# Patient Record
Sex: MALE | Race: WHITE | NOT HISPANIC OR LATINO | Employment: UNEMPLOYED | URBAN - METROPOLITAN AREA
[De-identification: names, ages, dates, MRNs, and addresses within clinical notes are randomized per-mention and may not be internally consistent; named-entity substitution may affect disease eponyms.]

---

## 2021-04-05 ENCOUNTER — HOSPITAL ENCOUNTER (OUTPATIENT)
Dept: OTHER | Facility: HOSPITAL | Age: 71
Discharge: HOME | End: 2021-04-05
Attending: INTERNAL MEDICINE

## 2021-04-05 LAB
CREAT BLD-MCNC: 1.9 MG/DL (ref 0.6–1.4)
GFR SERPLBLD BASED ON 1.73 SQ M-ARVRAT: 35 ML/MIN/{1.73_M2}

## 2021-11-10 ENCOUNTER — TRANSCRIBE ORDERS (OUTPATIENT)
Dept: ADMINISTRATIVE | Facility: HOSPITAL | Age: 71
End: 2021-11-10

## 2021-11-10 DIAGNOSIS — R53.83 OTHER FATIGUE: ICD-10-CM

## 2021-11-10 DIAGNOSIS — I10 BENIGN ESSENTIAL HTN: Primary | ICD-10-CM

## 2022-05-04 ENCOUNTER — TELEPHONE (OUTPATIENT)
Dept: UROLOGY | Facility: CLINIC | Age: 72
End: 2022-05-04

## 2022-11-13 ENCOUNTER — HOSPITAL ENCOUNTER (EMERGENCY)
Facility: HOSPITAL | Age: 72
End: 2022-11-13

## 2023-04-17 NOTE — PROGRESS NOTES
Chief Complaint  Diabetes (New patient no cgm or diabetic )    Referred By: Terry Judd MD    Subjective          Lamin Martinez presents to [facility] for diabetes medication management    History of Present Illness    Visit type:  to establish care  Diabetes type:  Type 2  Age at time of dx/Year of dx/Number of years: He was diagnosed in February 22  Family History of Diabetes: Brother and sister  Current diabetes status/concerns/issues: He self-referred himself to our office for management of his diabetes.Reports he is having some blurred vision.  States his vision is gotten a lot worse and its especially worse in the evening.  States he is also more sensitive to the sun. States he would like to get his blood sugar under better control and would like to learn foods to eat and not to eat to better control his diabetes.  Other current health concerns: None  Current Diabetes symptoms:    Polyuria: No   Polydipsia: No   Polyphagia: No   Blurred vision: Yes   Excessive fatigue: Yes  Known Diabetes complications:  Neuropathy: None; Location: N/A  Renal: Stage IIIb moderate (GFR = 30-44 mL/min. He is followed by Selma ZUNIGA at [facility]  Eyes: None; Location: N/A  Amputation/Wounds: None  GI: None  Cardiovascular: Hypertension, Hyperlipidemia, CAD and Other: History of 5 cardiac stents  ED: Patient Reported  Other: None  Hospitalizations/ED/911 secondary to DM?  No  Hypoglycemia:  None reported at this time  Hypoglycemia Symptoms:  No hypoglycemia at this time  Current Diabetes treatment: Actos 15 mg once daily and glipizide 10 mg twice daily.  Prior diabetes treatments:  Januvia  Using ACEI or ARB: Yes, Lisinopril 10 mg daily, Managed by other provider  Using Statin: Yes, Lipitor 80 mg once daily, Managed by other provider  Blood glucose device:  Does Not Test  Blood glucose monitoring frequency:  None  Blood glucose range/average:  n/a   Glucose Source:  N/A  Dietary behavior:  Limits high carb/sweet foods, Avoids sugary drinks, Number of meals each day - 2; Number of snacks each day - 1, History of Diabetes Nutrition Counseling - NO  Activity/Exercise:  He walks several days a week but reports she does have quite a bit of muscle aches and he gets short of breath  Last Eye Exam: None; Location: N/A  Last Foot Exam: None  Diabetes Education Hx: Comprehensive Diabetes Education  Social Determinants of Health: None    Past Medical History:   Diagnosis Date   • CAD (coronary artery disease)    • Diabetes    • History of cardiac cath      Past Surgical History:   Procedure Laterality Date   • CORONARY ANGIOPLASTY WITH STENT PLACEMENT      5 stents placed 2/22   • TONSILLECTOMY       Family History   Problem Relation Age of Onset   • Diabetes Sister    • Diabetes Brother      Social History     Socioeconomic History   • Marital status: Single   Tobacco Use   • Smoking status: Former     Types: Cigarettes   Substance and Sexual Activity   • Alcohol use: Not Currently   • Drug use: Never     No Known Allergies    Current Outpatient Medications:   •  Ascorbic Acid 500 MG chewable tablet, Chew 500 mg Daily., Disp: , Rfl:   •  aspirin 81 MG chewable tablet, Daily., Disp: , Rfl:   •  atorvastatin (LIPITOR) 80 MG tablet, Take 1 tablet by mouth Daily., Disp: , Rfl:   •  clopidogrel (PLAVIX) 75 MG tablet, Take 1 tablet by mouth Daily., Disp: , Rfl:   •  glipizide (GLUCOTROL XL) 10 MG 24 hr tablet, TAKE 1 TABLET BY MOUTH TWICE DAILY FOR BLOOD SUGAR, Disp: , Rfl:   •  isosorbide mononitrate (IMDUR) 30 MG 24 hr tablet, TAKE 1 TABLET BY MOUTH ONCE DAILY. DO NOT CRUSH OR CHEW, Disp: , Rfl:   •  lisinopril (PRINIVIL,ZESTRIL) 10 MG tablet, Take 1 tablet by mouth Daily., Disp: , Rfl:   •  multivitamin (THERAGRAN) tablet tablet, Take 1 tablet by mouth Daily., Disp: , Rfl:   •  pioglitazone (ACTOS) 15 MG tablet, Take 1 tablet by mouth Daily., Disp: , Rfl:   •  tamsulosin (FLOMAX) 0.4 MG  "capsule 24 hr capsule, , Disp: , Rfl:   •  vitamin D3 125 MCG (5000 UT) capsule capsule, TAKE 2 CAPSULES BY MOUTH ON MONDAY, WEDNESDAY, AND FRIDAY, Disp: , Rfl:   •  Blood Glucose Monitoring Suppl device, Use as directed for blood glucose monitoring, Disp: 1 each, Rfl: 0  •  dapagliflozin Propanediol (Farxiga) 10 MG tablet, Take 10 mg by mouth Daily for 90 days., Disp: 90 tablet, Rfl: 1  •  glucose blood test strip, Test blood glucose 1 time each day, Disp: 100 each, Rfl: 5  •  Lancets misc, Test blood glucose 1 time each day, Disp: 100 each, Rfl: 5    Objective     Vitals:    04/20/23 1306   BP: 127/58   BP Location: Left arm   Patient Position: Sitting   Cuff Size: Adult   Pulse: 69   Temp: 97.2 °F (36.2 °C)   TempSrc: Temporal   SpO2: 97%   Weight: 98.6 kg (217 lb 6.4 oz)   Height: 170.2 cm (67\")     Body mass index is 34.05 kg/m².    Physical Exam  Constitutional:       Appearance: Normal appearance. He is obese.      Comments: Obesity (BMI 30 - 39.9) Pt Current BMI = 34.05     HENT:      Head: Normocephalic and atraumatic.      Right Ear: External ear normal.      Left Ear: External ear normal.      Nose: Nose normal.   Eyes:      Extraocular Movements: Extraocular movements intact.      Conjunctiva/sclera: Conjunctivae normal.   Pulmonary:      Effort: Pulmonary effort is normal.   Musculoskeletal:         General: Normal range of motion.      Cervical back: Normal range of motion.   Skin:     General: Skin is warm and dry.   Neurological:      General: No focal deficit present.      Mental Status: He is alert and oriented to person, place, and time. Mental status is at baseline.   Psychiatric:         Mood and Affect: Mood normal.         Behavior: Behavior normal.         Thought Content: Thought content normal.         Judgment: Judgment normal.             Result Review :   The following data was reviewed by: EFRAIN Quesada on 04/20/2023:    Most Recent A1C        4/20/2023    13:21   HGBA1C " Most Recent   Hemoglobin A1C 9.3         A1C Last 3 Results        4/20/2023    13:21   HGBA1C Last 3 Results   Hemoglobin A1C 9.3       A1c in the office today is 9.3% indicating uncontrolled type 2 DM.    Glucose   Date Value Ref Range Status   04/20/2023 311 (H) 70 - 99 mg/dL Final     Comment:     Serial Number: [id number]:  [id number]     Point of care glucose is elevated at 311.               Assessment: Pt self referred himself to our office for management of his diabetes. He would like to meet with a dietician to teach him which foods are best for his diabetes and which foods to avoid. Ordered consult with diabetic educator as well.  He has reduced his carb and sugar intake. He is currently taking actos and glipizide. His a1c is 9.3% indicating uncontrolled type 2 DM. His history is significant for : Hypertension, Hyperlipidemia, CAD,  History of 5 cardiac stents and Stage IIIb moderate (GFR = 30-44 mL/min. He is followed by Selma ZUNIGA at [facility]. He admits he has not been checking his glucose.       Diagnoses and all orders for this visit:    1. Uncontrolled type 2 diabetes mellitus with hyperglycemia (Primary)  -     POC Glycosylated Hemoglobin (Hb A1C)  -     Lipid Panel; Future  -     Comprehensive Metabolic Panel; Future  -     Microalbumin / Creatinine Urine Ratio - Urine, Clean Catch; Future  -     Ambulatory Referral to [facility] - [facility]  -     Ambulatory Referral to Podiatry  -     dapagliflozin Propanediol (Farxiga) 10 MG tablet; Take 10 mg by mouth Daily for 90 days.  Dispense: 90 tablet; Refill: 1  -     Blood Glucose Monitoring Suppl device; Use as directed for blood glucose monitoring  Dispense: 1 each; Refill: 0  -     glucose blood test strip; Test blood glucose 1 time each day  Dispense: 100 each; Refill: 5  -     Lancets misc; Test blood glucose 1 time each day  Dispense: 100 each; Refill: 5    2. Screening for thyroid disorder  -     TSH;  Future    Other orders  -     POC Glucose        Plan: Placed a consult for the dietician for carb counting and low carb/low sugar diet. Prescribed Farxiga to improve glycemic control, reduce the risk of sustained eGFR decline, end stage kidney disease and cardiovascular death. Ordered labs-instructed pt not to start Farxiga until I review his labs. Sent in a new glucometer and supplies. Instructed to check his blood sugar every morning fasting. Instructed to bring a log to his next visit for review.    The patient will monitor his blood glucose levels once daily.  If he develops problematic hyperglycemia or hypoglycemia or adverse drug reactions, he will contact the office for further instructions.        Follow Up     Return in about 4 weeks (around 5/18/2023) for Medication Mgmt, DSME, DMNT.    Patient was given instructions and counseling regarding his condition or for health maintenance advice. Please see specific information pulled into the AVS if appropriate.     Michelle Walters, APRN  04/20/2023      Dictated Utilizing Dragon Dictation.  Please note that portions of this note were completed with a voice recognition program.  Part of this note may be an electronic transcription/translation of spoken language to printed text using the Dragon Dictation System.

## 2023-04-20 ENCOUNTER — OFFICE VISIT (OUTPATIENT)
Dept: DIABETES SERVICES | Facility: HOSPITAL | Age: 73
End: 2023-04-20
Payer: MEDICARE

## 2023-04-20 VITALS
HEART RATE: 69 BPM | BODY MASS INDEX: 34.12 KG/M2 | OXYGEN SATURATION: 97 % | DIASTOLIC BLOOD PRESSURE: 58 MMHG | WEIGHT: 217.4 LBS | HEIGHT: 67 IN | TEMPERATURE: 97.2 F | SYSTOLIC BLOOD PRESSURE: 127 MMHG

## 2023-04-20 DIAGNOSIS — Z13.29 SCREENING FOR THYROID DISORDER: ICD-10-CM

## 2023-04-20 DIAGNOSIS — E11.65 UNCONTROLLED TYPE 2 DIABETES MELLITUS WITH HYPERGLYCEMIA: Primary | ICD-10-CM

## 2023-04-20 PROBLEM — E11.9 DIABETES MELLITUS: Status: ACTIVE | Noted: 2023-04-20

## 2023-04-20 PROBLEM — E78.5 HYPERLIPIDEMIA: Status: ACTIVE | Noted: 2023-04-20

## 2023-04-20 PROBLEM — I10 HYPERTENSION: Status: ACTIVE | Noted: 2023-04-20

## 2023-04-20 LAB
EXPIRATION DATE: ABNORMAL
GLUCOSE BLDC GLUCOMTR-MCNC: 311 MG/DL (ref 70–99)
HBA1C MFR BLD: 9.3 %
Lab: ABNORMAL

## 2023-04-20 PROCEDURE — 82962 GLUCOSE BLOOD TEST: CPT | Performed by: NURSE PRACTITIONER

## 2023-04-20 PROCEDURE — G0463 HOSPITAL OUTPT CLINIC VISIT: HCPCS | Performed by: NURSE PRACTITIONER

## 2023-04-20 RX ORDER — DAPAGLIFLOZIN 10 MG/1
10 TABLET, FILM COATED ORAL DAILY
Qty: 90 TABLET | Refills: 1 | Status: SHIPPED | OUTPATIENT
Start: 2023-04-20 | End: 2023-07-19

## 2023-04-20 RX ORDER — LISINOPRIL 10 MG/1
1 TABLET ORAL DAILY
COMMUNITY
Start: 2023-02-20

## 2023-04-20 RX ORDER — CLOPIDOGREL BISULFATE 75 MG/1
1 TABLET ORAL DAILY
COMMUNITY
Start: 2023-03-13

## 2023-04-20 RX ORDER — PIOGLITAZONEHYDROCHLORIDE 15 MG/1
1 TABLET ORAL DAILY
COMMUNITY
Start: 2023-04-09

## 2023-04-20 RX ORDER — DIPHENOXYLATE HYDROCHLORIDE AND ATROPINE SULFATE 2.5; .025 MG/1; MG/1
1 TABLET ORAL DAILY
COMMUNITY

## 2023-04-20 RX ORDER — ATORVASTATIN CALCIUM 80 MG/1
1 TABLET, FILM COATED ORAL DAILY
COMMUNITY
Start: 2023-01-29

## 2023-04-20 RX ORDER — ASPIRIN 81 MG/1
TABLET, CHEWABLE ORAL DAILY
COMMUNITY

## 2023-04-20 RX ORDER — TAMSULOSIN HYDROCHLORIDE 0.4 MG/1
CAPSULE ORAL
COMMUNITY
Start: 2023-03-22

## 2023-04-20 RX ORDER — LANCETS 30 GAUGE
EACH MISCELLANEOUS
Qty: 100 EACH | Refills: 5 | Status: SHIPPED | OUTPATIENT
Start: 2023-04-20

## 2023-04-20 RX ORDER — ISOSORBIDE MONONITRATE 30 MG/1
TABLET, EXTENDED RELEASE ORAL
COMMUNITY
Start: 2023-04-09

## 2023-04-20 RX ORDER — GLIPIZIDE 10 MG/1
TABLET, FILM COATED, EXTENDED RELEASE ORAL
COMMUNITY
Start: 2023-02-14

## 2023-04-26 DIAGNOSIS — E11.65 UNCONTROLLED TYPE 2 DIABETES MELLITUS WITH HYPERGLYCEMIA: Primary | ICD-10-CM

## 2023-05-01 ENCOUNTER — TELEPHONE (OUTPATIENT)
Dept: DIABETES SERVICES | Facility: HOSPITAL | Age: 73
End: 2023-05-01
Payer: MEDICARE

## 2023-05-01 NOTE — TELEPHONE ENCOUNTER
Pt called and wanted you to know that when he went to go  the Jardiance from the pharmacy it was going to cost him 757$. He did not pick this medication up as it is to costly for him. Pt would like to know what you would like to do in place of this. Pt would like a return call at [phone]    Please advise

## 2023-05-02 DIAGNOSIS — E11.65 UNCONTROLLED TYPE 2 DIABETES MELLITUS WITH HYPERGLYCEMIA: Primary | ICD-10-CM

## 2023-05-02 NOTE — TELEPHONE ENCOUNTER
Patient has previously tried to get this medication at pharmacy prescription was also expensive please advise.

## 2023-05-02 NOTE — TELEPHONE ENCOUNTER
Lets have him increase his Actos to 30mg once day-he can double his 15mg to equal 30mg-so 2 tabs once day. He will need to call me when he needs a refill

## 2023-05-12 ENCOUNTER — EDUCATION (OUTPATIENT)
Dept: DIABETES SERVICES | Facility: HOSPITAL | Age: 73
End: 2023-05-12
Payer: MEDICARE

## 2023-05-12 DIAGNOSIS — E11.8 TYPE 2 DIABETES MELLITUS WITH UNSPECIFIED COMPLICATIONS: Primary | ICD-10-CM

## 2023-05-12 PROCEDURE — G0108 DIAB MANAGE TRN  PER INDIV: HCPCS

## 2023-05-12 NOTE — LETTER
May 22, 2023       No Recipients    Patient: Lamin Martinez   YOB: 1950   Date of Visit: 5/12/2023       Dear Dr. Valladares Recipients:    Thank you for referring Lamin Martinez to me for evaluation. Below are the relevant portions of my assessment and plan of care.    If you have questions, please do not hesitate to call me. I look forward to following Lamin along with you.         Sincerely,        Liudmila Mantilla RN        CC:   No Recipients     Initial Visit and Education Plan:  Lamin Martinez 72 y.o. presented to [facility] at [facility] for initial self diabetes management education and training.  Patient states the reason for their visit is to lower his blood glucose.  Lamin Martinez is referred by Michelle Walters,*.     Ht: 67 inches    Wt: 217 pounds    No Known Allergies     LABS:  Lab Results (most recent)       Per EFRAIN Randall note for 2023 hemoglobin A1c was 9.3           Labs reviewed with patient.  I explained in detail what a hemoglobin A1c is.  I also explained his blood glucose was considered uncontrolled.    Past Medical History:   Diagnosis Date   • CAD (coronary artery disease)    • Diabetes    • History of cardiac cath         Past Surgical History:   • CORONARY ANGIOPLASTY WITH STENT PLACEMENT    5 stents placed 2/22   • TONSILLECTOMY        Social History     Tobacco Use   • Smoking status: Former     Types: Cigarettes   Substance Use Topics   • Alcohol use: Not Currently   • Drug use: Never        Family History   Problem Relation Age of Onset   • Diabetes Sister    • Diabetes Brother         Education Plan as follows:      Blood Glucose Monitoring Instructions and Plan:   We reviewed blood glucose testing and ADA recommended blood glucose level for fasting, pre and post meals. Patient demonstrates understanding and importance of testing blood glucose *** times a day; Patient will log BG results. Patient was given written  material including blood glucose log.  I gave Mr. Martinez a sample glucose meter today.    Initial Nutrition Instructions and Plan:   Patient was instructed and demonstrated reading a food label. Serving size and carbohydrate amounts were emphasized.   60 carbs per meal 3 meals a day  15-20 carbs per snacks 3 snacks per day.   Patient was given written materials..   [employer]itness Pal Herson explained and demonstrated to patient.     Medication Instructions and Plan:     Current Outpatient Medications:   •  Ascorbic Acid 500 MG chewable tablet, Chew 500 mg Daily., Disp: , Rfl:   •  aspirin 81 MG chewable tablet, Daily., Disp: , Rfl:   •  atorvastatin (LIPITOR) 80 MG tablet, Take 1 tablet by mouth Daily., Disp: , Rfl:   •  Blood Glucose Monitoring Suppl device, Use as directed for blood glucose monitoring, Disp: 1 each, Rfl: 0  •  clopidogrel (PLAVIX) 75 MG tablet, Take 1 tablet by mouth Daily., Disp: , Rfl:   •  empagliflozin (Jardiance) 10 MG tablet tablet, Take 1 tablet by mouth Daily for 90 days., Disp: 90 tablet, Rfl: 1  •  glipizide (GLUCOTROL XL) 10 MG 24 hr tablet, TAKE 1 TABLET BY MOUTH TWICE DAILY FOR BLOOD SUGAR, Disp: , Rfl:   •  glucose blood test strip, Test blood glucose 1 time each day, Disp: 100 each, Rfl: 5  •  isosorbide mononitrate (IMDUR) 30 MG 24 hr tablet, TAKE 1 TABLET BY MOUTH ONCE DAILY. DO NOT CRUSH OR CHEW, Disp: , Rfl:   •  Lancets misc, Test blood glucose 1 time each day, Disp: 100 each, Rfl: 5  •  lisinopril (PRINIVIL,ZESTRIL) 10 MG tablet, Take 1 tablet by mouth Daily., Disp: , Rfl:   •  multivitamin (THERAGRAN) tablet tablet, Take 1 tablet by mouth Daily., Disp: , Rfl:   •  pioglitazone (ACTOS) 15 MG tablet, Take 1 tablet by mouth Daily., Disp: , Rfl:   •  tamsulosin (FLOMAX) 0.4 MG capsule 24 hr capsule, , Disp: , Rfl:   •  vitamin D3 125 MCG (5000 UT) capsule capsule, TAKE 2 CAPSULES BY MOUTH ON MONDAY, WEDNESDAY, AND FRIDAY, Disp: , Rfl:    Medication list was reviewed with patient.  Patient denies questions or concerns regarding current medication therapy. Patient was instructed to continue medications as prescribed.  Explained the benefits of insulin.  We also discussed continuous glucose monitor.    Exercise:   Patient has been instructed to walk for 10 minutes after each meal initially and build strength and endurance to achieve 30 minutes of sustained exercise per day; recommended patient seek advice from Primary Care Provider prior to beginning any exercise program if other health concerns exist.     Problem solving and reducing risks:   I explained the importance of keeping provider appointments, taking medications as prescribed, having laboratory work performed as ordered by provider and seeking care as soon as possible when complications occur.     Patient Selected Behavioral Goal :  #1 -to test blood glucose 1 time a day    Patient Selected Ongoing Support Plan:  Friend Support          Follow up Instructions and Plan:Patient was encouraged to contact DSME staff with questions and or concerns.  DSME staff contact information was given to patient.  Patient will be contacted when group classes resume.  DSME staff will contact patient at 3 months and 6 months to evaluate patient's further needs regarding diabetes.        This note will be forwarded to PCP.  CYNTHIA CALDERON-AWZANE, MLDE, [facility] [place]    Start Time: 2: 25  End Time: 3: 45      05/12/2023

## 2023-05-12 NOTE — LETTER
May 22, 2023       No Recipients    Patient: Lamin Martinez   YOB: 1950   Date of Visit: 5/12/2023       Dear Dr. Valladares Recipients:    Thank you for referring Lamin Martinez to me for evaluation. Below are the relevant portions of my assessment and plan of care.    If you have questions, please do not hesitate to call me. I look forward to following Lamin along with you.         Sincerely,        Liudmila Mantilla RN        CC:   No Recipients     Initial Visit and Education Plan:  Lamin Martinez 72 y.o. presented to [facility] at [facility] for initial self diabetes management education and training.  Patient states the reason for their visit is to lower his blood glucose.  Lamin Martinez is referred by Michelle Walters,*.     Ht: 67 inches    Wt: 217 pounds    No Known Allergies     LABS:  Lab Results (most recent)       Per EFRAIN Randall note for 2023 hemoglobin A1c was 9.3           Labs reviewed with patient.  I explained in detail what a hemoglobin A1c is.  I also explained his blood glucose was considered uncontrolled.    Past Medical History:   Diagnosis Date   • CAD (coronary artery disease)    • Diabetes    • History of cardiac cath         Past Surgical History:   • CORONARY ANGIOPLASTY WITH STENT PLACEMENT    5 stents placed 2/22   • TONSILLECTOMY        Social History     Tobacco Use   • Smoking status: Former     Types: Cigarettes   Substance Use Topics   • Alcohol use: Not Currently   • Drug use: Never        Family History   Problem Relation Age of Onset   • Diabetes Sister    • Diabetes Brother         Education Plan as follows:      Blood Glucose Monitoring Instructions and Plan:   We reviewed blood glucose testing and ADA recommended blood glucose level for fasting, pre and post meals. Patient demonstrates understanding and importance of testing blood glucose 1-2 times a day; Patient will log BG results. Patient was given written  material including blood glucose log.  I gave Mr. Martinez a sample glucose meter today.    Initial Nutrition Instructions and Plan:   Patient was instructed and demonstrated reading a food label. Serving size and carbohydrate amounts were emphasized.   60 carbs per meal 3 meals a day  15-20 carbs per snacks 3 snacks per day.   Patient was given written materials..   [employer] Pal Herson explained and demonstrated to patient.     Medication Instructions and Plan:     Current Outpatient Medications:   •  Ascorbic Acid 500 MG chewable tablet, Chew 500 mg Daily., Disp: , Rfl:   •  aspirin 81 MG chewable tablet, Daily., Disp: , Rfl:   •  atorvastatin (LIPITOR) 80 MG tablet, Take 1 tablet by mouth Daily., Disp: , Rfl:   •  Blood Glucose Monitoring Suppl device, Use as directed for blood glucose monitoring, Disp: 1 each, Rfl: 0  •  clopidogrel (PLAVIX) 75 MG tablet, Take 1 tablet by mouth Daily., Disp: , Rfl:   •  empagliflozin (Jardiance) 10 MG tablet tablet, Take 1 tablet by mouth Daily for 90 days., Disp: 90 tablet, Rfl: 1  •  glipizide (GLUCOTROL XL) 10 MG 24 hr tablet, TAKE 1 TABLET BY MOUTH TWICE DAILY FOR BLOOD SUGAR, Disp: , Rfl:   •  glucose blood test strip, Test blood glucose 1 time each day, Disp: 100 each, Rfl: 5  •  isosorbide mononitrate (IMDUR) 30 MG 24 hr tablet, TAKE 1 TABLET BY MOUTH ONCE DAILY. DO NOT CRUSH OR CHEW, Disp: , Rfl:   •  Lancets misc, Test blood glucose 1 time each day, Disp: 100 each, Rfl: 5  •  lisinopril (PRINIVIL,ZESTRIL) 10 MG tablet, Take 1 tablet by mouth Daily., Disp: , Rfl:   •  multivitamin (THERAGRAN) tablet tablet, Take 1 tablet by mouth Daily., Disp: , Rfl:   •  pioglitazone (ACTOS) 15 MG tablet, Take 1 tablet by mouth Daily., Disp: , Rfl:   •  tamsulosin (FLOMAX) 0.4 MG capsule 24 hr capsule, , Disp: , Rfl:   •  vitamin D3 125 MCG (5000 UT) capsule capsule, TAKE 2 CAPSULES BY MOUTH ON MONDAY, WEDNESDAY, AND FRIDAY, Disp: , Rfl:    Medication list was reviewed with patient.  Patient denies questions or concerns regarding current medication therapy. Patient was instructed to continue medications as prescribed.  Explained the benefits of insulin.  We also discussed continuous glucose monitor.    Exercise:   Patient has been instructed to walk for 10 minutes after each meal initially and build strength and endurance to achieve 30 minutes of sustained exercise per day; recommended patient seek advice from Primary Care Provider prior to beginning any exercise program if other health concerns exist.     Problem solving and reducing risks:   I explained the importance of keeping provider appointments, taking medications as prescribed, having laboratory work performed as ordered by provider and seeking care as soon as possible when complications occur.     Patient Selected Behavioral Goal :  #1 -to test blood glucose 1 time a day    Patient Selected Ongoing Support Plan:  Friend Support          Follow up Instructions and Plan:Patient was encouraged to contact DSME staff with questions and or concerns.  DSME staff contact information was given to patient.  Patient will be contacted when group classes resume.  DSME staff will contact patient at 3 months and 6 months to evaluate patient's further needs regarding diabetes.        This note will be forwarded to PCP.  CYNTHIA CALDERON-AWZANE, MLDE, [facility]    Start Time: 2: 25  End Time: 3: 45      05/12/2023

## 2023-05-12 NOTE — PROGRESS NOTES
Initial Visit and Education Plan:  Lamin Martinez 72 y.o. presented to [facility] at [facility] for initial self diabetes management education and training.  Patient states the reason for their visit is to lower his blood glucose.  Lamin Martinez is referred by Michelle Walters,*.     Ht: 67 inches    Wt: 217 pounds    No Known Allergies     LABS:  Lab Results (most recent)     Per EFRAIN Randall note for 2023 hemoglobin A1c was 9.3         Labs reviewed with patient.  I explained in detail what a hemoglobin A1c is.  I also explained his blood glucose was considered uncontrolled.    Past Medical History:   Diagnosis Date   • CAD (coronary artery disease)    • Diabetes    • History of cardiac cath         Past Surgical History:   • CORONARY ANGIOPLASTY WITH STENT PLACEMENT    5 stents placed 2/22   • TONSILLECTOMY        Social History     Tobacco Use   • Smoking status: Former     Types: Cigarettes   Substance Use Topics   • Alcohol use: Not Currently   • Drug use: Never        Family History   Problem Relation Age of Onset   • Diabetes Sister    • Diabetes Brother         Education Plan as follows:      Blood Glucose Monitoring Instructions and Plan:   We reviewed blood glucose testing and ADA recommended blood glucose level for fasting, pre and post meals. Patient demonstrates understanding and importance of testing blood glucose 1-2 times a day; Patient will log BG results. Patient was given written material including blood glucose log.  I gave Mr. Martinez a sample glucose meter today.    Initial Nutrition Instructions and Plan:   Patient was instructed and demonstrated reading a food label. Serving size and carbohydrate amounts were emphasized.   60 carbs per meal 3 meals a day  15-20 carbs per snacks 3 snacks per day.   Patient was given written materials..   My Fitness Pal Herson explained and demonstrated to patient.     Medication Instructions and Plan:     Current  Outpatient Medications:   •  Ascorbic Acid 500 MG chewable tablet, Chew 500 mg Daily., Disp: , Rfl:   •  aspirin 81 MG chewable tablet, Daily., Disp: , Rfl:   •  atorvastatin (LIPITOR) 80 MG tablet, Take 1 tablet by mouth Daily., Disp: , Rfl:   •  Blood Glucose Monitoring Suppl device, Use as directed for blood glucose monitoring, Disp: 1 each, Rfl: 0  •  clopidogrel (PLAVIX) 75 MG tablet, Take 1 tablet by mouth Daily., Disp: , Rfl:   •  empagliflozin (Jardiance) 10 MG tablet tablet, Take 1 tablet by mouth Daily for 90 days., Disp: 90 tablet, Rfl: 1  •  glipizide (GLUCOTROL XL) 10 MG 24 hr tablet, TAKE 1 TABLET BY MOUTH TWICE DAILY FOR BLOOD SUGAR, Disp: , Rfl:   •  glucose blood test strip, Test blood glucose 1 time each day, Disp: 100 each, Rfl: 5  •  isosorbide mononitrate (IMDUR) 30 MG 24 hr tablet, TAKE 1 TABLET BY MOUTH ONCE DAILY. DO NOT CRUSH OR CHEW, Disp: , Rfl:   •  Lancets misc, Test blood glucose 1 time each day, Disp: 100 each, Rfl: 5  •  lisinopril (PRINIVIL,ZESTRIL) 10 MG tablet, Take 1 tablet by mouth Daily., Disp: , Rfl:   •  multivitamin (THERAGRAN) tablet tablet, Take 1 tablet by mouth Daily., Disp: , Rfl:   •  pioglitazone (ACTOS) 15 MG tablet, Take 1 tablet by mouth Daily., Disp: , Rfl:   •  tamsulosin (FLOMAX) 0.4 MG capsule 24 hr capsule, , Disp: , Rfl:   •  vitamin D3 125 MCG (5000 UT) capsule capsule, TAKE 2 CAPSULES BY MOUTH ON MONDAY, WEDNESDAY, AND FRIDAY, Disp: , Rfl:    Medication list was reviewed with patient. Patient denies questions or concerns regarding current medication therapy. Patient was instructed to continue medications as prescribed.  Explained the benefits of insulin.  We also discussed continuous glucose monitor.    Exercise:   Patient has been instructed to walk for 10 minutes after each meal initially and build strength and endurance to achieve 30 minutes of sustained exercise per day; recommended patient seek advice from Primary Care Provider prior to beginning any  exercise program if other health concerns exist.     Problem solving and reducing risks:   I explained the importance of keeping provider appointments, taking medications as prescribed, having laboratory work performed as ordered by provider and seeking care as soon as possible when complications occur.     Patient Selected Behavioral Goal :  #1 -to test blood glucose 1 time a day    Patient Selected Ongoing Support Plan:  Friend Support          Follow up Instructions and Plan:Patient was encouraged to contact DSME staff with questions and or concerns.  DSME staff contact information was given to patient.  Patient will be contacted when group classes resume.  DSME staff will contact patient at 3 months and 6 months to evaluate patient's further needs regarding diabetes.        This note will be forwarded to PCP.  CYNTHIA CALDERON-AWHC, MLDE, CDCES    Start Time: 2: 25  End Time: 3: 45      05/12/2023

## 2023-05-12 NOTE — LETTER
May 22, 2023     Terry Judd MD  [address] [facility] [address]    Patient: Lamin Martinez   YOB: 1950   Date of Visit: 5/12/2023       Dear Dr. Binta MD:    Thank you for referring Lamin Martinez to me for evaluation. Below are the relevant portions of my assessment and plan of care.    If you have questions, please do not hesitate to call me. I look forward to following Lamin along with you.         Sincerely,        Liudmila Mantilla RN        CC: No Recipients     Initial Visit and Education Plan:  Lamin Martinez 72 y.o. presented to [facility] at [facility] for initial self diabetes management education and training.  Patient states the reason for their visit is to lower his blood glucose.  Lamin Martinez is referred by Michelle Walters,*.     Ht: 67 inches    Wt: 217 pounds    No Known Allergies     LABS:  Lab Results (most recent)       Per EFRAIN Randall note for 2023 hemoglobin A1c was 9.3           Labs reviewed with patient.  I explained in detail what a hemoglobin A1c is.  I also explained his blood glucose was considered uncontrolled.    Past Medical History:   Diagnosis Date   • CAD (coronary artery disease)    • Diabetes    • History of cardiac cath         Past Surgical History:   • CORONARY ANGIOPLASTY WITH STENT PLACEMENT    5 stents placed 2/22   • TONSILLECTOMY        Social History     Tobacco Use   • Smoking status: Former     Types: Cigarettes   Substance Use Topics   • Alcohol use: Not Currently   • Drug use: Never        Family History   Problem Relation Age of Onset   • Diabetes Sister    • Diabetes Brother         Education Plan as follows:      Blood Glucose Monitoring Instructions and Plan:   We reviewed blood glucose testing and ADA recommended blood glucose level for fasting, pre and post meals. Patient demonstrates understanding and importance of testing blood glucose 1-2 times a day; Patient will log BG results.  Patient was given written material including blood glucose log.  I gave Mr. Martinez a sample glucose meter today.    Initial Nutrition Instructions and Plan:   Patient was instructed and demonstrated reading a food label. Serving size and carbohydrate amounts were emphasized.   60 carbs per meal 3 meals a day  15-20 carbs per snacks 3 snacks per day.   Patient was given written materials..   [employer] Pal Herson explained and demonstrated to patient.     Medication Instructions and Plan:     Current Outpatient Medications:   •  Ascorbic Acid 500 MG chewable tablet, Chew 500 mg Daily., Disp: , Rfl:   •  aspirin 81 MG chewable tablet, Daily., Disp: , Rfl:   •  atorvastatin (LIPITOR) 80 MG tablet, Take 1 tablet by mouth Daily., Disp: , Rfl:   •  Blood Glucose Monitoring Suppl device, Use as directed for blood glucose monitoring, Disp: 1 each, Rfl: 0  •  clopidogrel (PLAVIX) 75 MG tablet, Take 1 tablet by mouth Daily., Disp: , Rfl:   •  empagliflozin (Jardiance) 10 MG tablet tablet, Take 1 tablet by mouth Daily for 90 days., Disp: 90 tablet, Rfl: 1  •  glipizide (GLUCOTROL XL) 10 MG 24 hr tablet, TAKE 1 TABLET BY MOUTH TWICE DAILY FOR BLOOD SUGAR, Disp: , Rfl:   •  glucose blood test strip, Test blood glucose 1 time each day, Disp: 100 each, Rfl: 5  •  isosorbide mononitrate (IMDUR) 30 MG 24 hr tablet, TAKE 1 TABLET BY MOUTH ONCE DAILY. DO NOT CRUSH OR CHEW, Disp: , Rfl:   •  Lancets misc, Test blood glucose 1 time each day, Disp: 100 each, Rfl: 5  •  lisinopril (PRINIVIL,ZESTRIL) 10 MG tablet, Take 1 tablet by mouth Daily., Disp: , Rfl:   •  multivitamin (THERAGRAN) tablet tablet, Take 1 tablet by mouth Daily., Disp: , Rfl:   •  pioglitazone (ACTOS) 15 MG tablet, Take 1 tablet by mouth Daily., Disp: , Rfl:   •  tamsulosin (FLOMAX) 0.4 MG capsule 24 hr capsule, , Disp: , Rfl:   •  vitamin D3 125 MCG (5000 UT) capsule capsule, TAKE 2 CAPSULES BY MOUTH ON MONDAY, WEDNESDAY, AND FRIDAY, Disp: , Rfl:    Medication list was  reviewed with patient. Patient denies questions or concerns regarding current medication therapy. Patient was instructed to continue medications as prescribed.  Explained the benefits of insulin.  We also discussed continuous glucose monitor.    Exercise:   Patient has been instructed to walk for 10 minutes after each meal initially and build strength and endurance to achieve 30 minutes of sustained exercise per day; recommended patient seek advice from Primary Care Provider prior to beginning any exercise program if other health concerns exist.     Problem solving and reducing risks:   I explained the importance of keeping provider appointments, taking medications as prescribed, having laboratory work performed as ordered by provider and seeking care as soon as possible when complications occur.     Patient Selected Behavioral Goal :  #1 -to test blood glucose 1 time a day    Patient Selected Ongoing Support Plan:  Friend Support          Follow up Instructions and Plan:Patient was encouraged to contact DSME staff with questions and or concerns.  DSME staff contact information was given to patient.  Patient will be contacted when group classes resume.  DSME staff will contact patient at 3 months and 6 months to evaluate patient's further needs regarding diabetes.        This note will be forwarded to PCP.  CYNTHIA CALDERON-AWZANE, MLDE, [facility]    Start Time: 2: 25  End Time: 3: 45      05/12/2023

## 2023-06-06 ENCOUNTER — OFFICE VISIT (OUTPATIENT)
Dept: DIABETES SERVICES | Facility: HOSPITAL | Age: 73
End: 2023-06-06
Payer: MEDICARE

## 2023-06-06 VITALS
HEART RATE: 65 BPM | SYSTOLIC BLOOD PRESSURE: 124 MMHG | DIASTOLIC BLOOD PRESSURE: 50 MMHG | WEIGHT: 212.4 LBS | BODY MASS INDEX: 33.34 KG/M2 | OXYGEN SATURATION: 97 % | HEIGHT: 67 IN

## 2023-06-06 DIAGNOSIS — E11.65 UNCONTROLLED TYPE 2 DIABETES MELLITUS WITH HYPERGLYCEMIA: Primary | ICD-10-CM

## 2023-06-06 DIAGNOSIS — E11.22 TYPE 2 DIABETES MELLITUS WITH STAGE 3B CHRONIC KIDNEY DISEASE, WITHOUT LONG-TERM CURRENT USE OF INSULIN: ICD-10-CM

## 2023-06-06 DIAGNOSIS — N18.32 TYPE 2 DIABETES MELLITUS WITH STAGE 3B CHRONIC KIDNEY DISEASE, WITHOUT LONG-TERM CURRENT USE OF INSULIN: ICD-10-CM

## 2023-06-06 DIAGNOSIS — E66.9 OBESITY (BMI 30-39.9): ICD-10-CM

## 2023-06-06 LAB — GLUCOSE BLDC GLUCOMTR-MCNC: 228 MG/DL (ref 70–99)

## 2023-06-06 PROCEDURE — G0463 HOSPITAL OUTPT CLINIC VISIT: HCPCS | Performed by: NURSE PRACTITIONER

## 2023-06-06 PROCEDURE — 82948 REAGENT STRIP/BLOOD GLUCOSE: CPT | Performed by: NURSE PRACTITIONER

## 2023-06-06 RX ORDER — PIOGLITAZONEHYDROCHLORIDE 15 MG/1
15 TABLET ORAL 2 TIMES DAILY
Qty: 180 TABLET | Refills: 1 | Status: SHIPPED | OUTPATIENT
Start: 2023-06-06 | End: 2023-12-03

## 2023-06-06 RX ORDER — GLIPIZIDE 10 MG/1
10 TABLET, FILM COATED, EXTENDED RELEASE ORAL 2 TIMES DAILY
Qty: 180 TABLET | Refills: 1 | Status: SHIPPED | OUTPATIENT
Start: 2023-06-06 | End: 2023-12-03

## 2023-06-06 NOTE — PROGRESS NOTES
Chief Complaint  Diabetes (Follow up, no devices )    Referred By: Self Referring    Subjective          Lamin Martinez presents to [facility] for diabetes medication management    History of Present Illness    Visit type:  follow-up  Diabetes type:  Type 2  Current diabetes status/concerns/issues:  He is here today for a 1 month follow up on his diabetes. Reports he went to his eye doctor and was dx with retinopathy. States he stopped driving for the last couple wks due to blurred vision.  Last visit Farxiga 10 mg daily was prescribed but his insurance would not cover it then Jardiance 25 mg was sent and his insurance would also not cover this so his dose of Actos was increased from 15 mg once a day to 15 mg twice a day.  He states since the increased dose of Actos he is having some diarrhea and upset stomach. He met with our dietician and nurse educator. He states he feels his diet has improved since meeting with them. He states he cut out the carbohydrates and sugar. He is no longer eating white bread, potatoes, rice or pasta. He is eating salads and baked chicken and turkey. He cut out on the snack cakes and honey buns. Reports he has been drinking more water and occ diet soda. He got rid of sweet tea. States he is trying to get his diabetes under control especially with his recent vision problems. The nurse educator gave him a glucometer and taught him how to use it. He brought a blood sugar log with him to the office.  Other health concerns: States since his open heart surgery he does have any stamina. He had stents placed 15months ago. He discussed this with cardiology and his cardiologist is going to run more tests. He saw nephrology and no changes were made-he is monitoring his kindney function. Reports he would like to have his cataract surgery but he has to get his A1c under control prior to surgery.   Current Diabetes symptoms:    Polyuria: No   Polydipsia:  No   Polyphagia: No   Blurred vision: Yes   Excessive fatigue: Yes  Known Diabetes complications:  Neuropathy: None; Location: N/A  Renal: Stage IIIb moderate (GFR = 30-44 mL/min. He is followed by Selma ZUNIGA at [facility]  Eyes: None; Location: N/A  Amputation/Wounds: None  GI: None  Cardiovascular: Hypertension, Hyperlipidemia, CAD and Other: History of 5 cardiac stents  ED: Patient Reported  Other: None  Hypoglycemia:  None reported at this time  Hypoglycemia Symptoms:  No hypoglycemia at this time  Current diabetes treatment:  Actos 15mg bid, Glipizide 10mg bid.   Blood glucose device:  Meter  Blood glucose monitoring frequency:  1  Blood glucose range/average:     165-214  Glucose Source: BG Logs  Diet:  Limits high carb/sweet foods, Avoids sugary drinks, History of Diabetes Nutrition Counseling - YES  Activity/Exercise:   he walks several days wk short distances     Past Medical History:   Diagnosis Date    CAD (coronary artery disease)     Diabetes     History of cardiac cath      Past Surgical History:   Procedure Laterality Date    CORONARY ANGIOPLASTY WITH STENT PLACEMENT      5 stents placed 2/22    TONSILLECTOMY       Family History   Problem Relation Age of Onset    Diabetes Sister     Diabetes Brother      Social History     Socioeconomic History    Marital status: Single   Tobacco Use    Smoking status: Former     Types: Cigarettes   Substance and Sexual Activity    Alcohol use: Not Currently    Drug use: Never     No Known Allergies    Current Outpatient Medications:     Ascorbic Acid 500 MG chewable tablet, Chew 500 mg Daily., Disp: , Rfl:     aspirin 81 MG chewable tablet, Daily., Disp: , Rfl:     atorvastatin (LIPITOR) 80 MG tablet, Take 1 tablet by mouth Daily., Disp: , Rfl:     Blood Glucose Monitoring Suppl device, Use as directed for blood glucose monitoring, Disp: 1 each, Rfl: 0    clopidogrel (PLAVIX) 75 MG tablet, Take 1 tablet by mouth Daily., Disp: , Rfl:      "glipizide (GLUCOTROL XL) 10 MG 24 hr tablet, Take 1 tablet by mouth 2 (Two) Times a Day for 180 days., Disp: 180 tablet, Rfl: 1    glucose blood test strip, Test blood glucose 1 time each day, Disp: 100 each, Rfl: 5    isosorbide mononitrate (IMDUR) 30 MG 24 hr tablet, TAKE 1 TABLET BY MOUTH ONCE DAILY. DO NOT CRUSH OR CHEW, Disp: , Rfl:     Lancets misc, Test blood glucose 1 time each day, Disp: 100 each, Rfl: 5    lisinopril (PRINIVIL,ZESTRIL) 10 MG tablet, Take 1 tablet by mouth Daily., Disp: , Rfl:     multivitamin (THERAGRAN) tablet tablet, Take 1 tablet by mouth Daily., Disp: , Rfl:     pioglitazone (ACTOS) 15 MG tablet, Take 1 tablet by mouth 2 (Two) Times a Day for 180 days., Disp: 180 tablet, Rfl: 1    tamsulosin (FLOMAX) 0.4 MG capsule 24 hr capsule, , Disp: , Rfl:     vitamin D3 125 MCG (5000 UT) capsule capsule, TAKE 2 CAPSULES BY MOUTH ON MONDAY, WEDNESDAY, AND FRIDAY, Disp: , Rfl:     Objective     Vitals:    06/06/23 1412   BP: 124/50   BP Location: Left arm   Patient Position: Sitting   Cuff Size: Adult   Pulse: 65   SpO2: 97%   Weight: 96.3 kg (212 lb 6.4 oz)   Height: 170.2 cm (67\")     Body mass index is 33.27 kg/m².    Physical Exam  Constitutional:       Appearance: Normal appearance. He is normal weight. He is obese.      Comments: Obesity (BMI 30 - 39.9) Pt Current BMI = 33.27      HENT:      Head: Normocephalic and atraumatic.      Right Ear: External ear normal.      Left Ear: External ear normal.      Nose: Nose normal.   Eyes:      Extraocular Movements: Extraocular movements intact.      Conjunctiva/sclera: Conjunctivae normal.   Pulmonary:      Effort: Pulmonary effort is normal.   Musculoskeletal:         General: Normal range of motion.      Cervical back: Normal range of motion.   Skin:     General: Skin is warm and dry.   Neurological:      General: No focal deficit present.      Mental Status: He is alert and oriented to person, place, and time. Mental status is at baseline. "   Psychiatric:         Mood and Affect: Mood normal.         Behavior: Behavior normal.         Thought Content: Thought content normal.         Judgment: Judgment normal.       Result Review :   The following data was reviewed by: EFRAIN Quesada on 06/06/2023:    Most Recent A1C          4/20/2023    13:21   HGBA1C Most Recent   Hemoglobin A1C 9.3        A1C Last 3 Results          4/20/2023    13:21   HGBA1C Last 3 Results   Hemoglobin A1C 9.3      A1c on 4/20/2023 was 9.3% indicating uncontrolled type 2 diabetes.    Glucose   Date Value Ref Range Status   06/06/2023 228 (H) 70 - 99 mg/dL Final     Comment:     Serial Number: [id number]:  [id number]     Point-of-care A1c in the office is elevated to 28 mg/dL.                Assessment: Patient is here today for 1 month follow-up on his diabetes.  Last visit Farxiga 10 mg daily was prescribed however his insurance would not cover this medication.  Attempted to send in Jardiance 25 mg daily as well but again his insurance would not cover this medication.  Spoke with his cardiologist Dr. Deyvi Park and he had recommended patient stopping Actos and starting an SGLT2 due to his history of coronary artery disease and moderate kidney disease but discussed with him lack of insurance coverage for these medications.  Patient's insurance is covering his Actos.Last visit his dose was increased to 15 mg twice daily since we could not get coverage for a SGLT-2.  Patient denies any increase shortness of breath since increasing his dose of Actos.  He reports his glucose levels are running from 165 mg/dL to 214 mg/dL.      Diagnoses and all orders for this visit:    1. Uncontrolled type 2 diabetes mellitus with hyperglycemia (Primary)  -     pioglitazone (ACTOS) 15 MG tablet; Take 1 tablet by mouth 2 (Two) Times a Day for 180 days.  Dispense: 180 tablet; Refill: 1  -     glipizide (GLUCOTROL XL) 10 MG 24 hr tablet; Take 1 tablet by mouth 2 (Two) Times a Day  for 180 days.  Dispense: 180 tablet; Refill: 1    2. Obesity (BMI 30-39.9)    3. Type 2 diabetes mellitus with stage 3b chronic kidney disease, without long-term current use of insulin    Other orders  -     POC Glucose        Plan: No changes were made at today's visit.  Scheduled a follow-up in 1 month.  An A1c will be checked at his next visit.    The patient will monitor his blood glucose levels once daily.  If he develops problematic hyperglycemia or hypoglycemia or adverse drug reactions, he will contact the office for further instructions.        Follow Up     Return in about 4 weeks (around 7/4/2023) for Medication Mgmt.    Patient was given instructions and counseling regarding his condition or for health maintenance advice. Please see specific information pulled into the AVS if appropriate.     Michelle Walters, EFRAIN  06/06/2023      Dictated Utilizing Dragon Dictation.  Please note that portions of this note were completed with a voice recognition program.  Part of this note may be an electronic transcription/translation of spoken language to printed text using the Dragon Dictation System.

## 2023-06-12 ENCOUNTER — TELEPHONE (OUTPATIENT)
Dept: DIABETES SERVICES | Facility: HOSPITAL | Age: 73
End: 2023-06-12
Payer: MEDICARE

## 2023-06-12 NOTE — TELEPHONE ENCOUNTER
Lamin Martinez Key: [id number] - PA Case ID: [id number] - Rx #: [id number]  Pioglitazone HCl 15MG tabletsApprovedtoday  PA Case: [id number], Status: Approved, Coverage Starts on: 1/1/2023 12:00:00 AM, Coverage Ends on: 12/31/2023 12:00:00 AM. Questions? Contact [phone].

## 2023-06-13 ENCOUNTER — TELEPHONE (OUTPATIENT)
Dept: DIABETES SERVICES | Facility: HOSPITAL | Age: 73
End: 2023-06-13
Payer: MEDICARE

## 2023-06-13 DIAGNOSIS — E11.65 UNCONTROLLED TYPE 2 DIABETES MELLITUS WITH HYPERGLYCEMIA: ICD-10-CM

## 2023-06-13 RX ORDER — PIOGLITAZONEHYDROCHLORIDE 15 MG/1
15 TABLET ORAL DAILY
Qty: 90 TABLET | Refills: 1 | Status: SHIPPED | OUTPATIENT
Start: 2023-06-13 | End: 2023-12-10

## 2023-06-13 NOTE — TELEPHONE ENCOUNTER
Patient called in stating that he went to  his pioglitazone from pharmacy for the 2 a day and that insurance does not want to cover it for twice a day they only want to cover it once a day, patient stated he has a week left but will need this refilled.  Patient verified pharmacy.

## 2023-06-16 ENCOUNTER — OFFICE VISIT (OUTPATIENT)
Dept: FAMILY MEDICINE CLINIC | Age: 73
End: 2023-06-16
Payer: MEDICARE

## 2023-06-16 VITALS
BODY MASS INDEX: 33.02 KG/M2 | WEIGHT: 210.4 LBS | TEMPERATURE: 98.3 F | DIASTOLIC BLOOD PRESSURE: 67 MMHG | HEART RATE: 62 BPM | OXYGEN SATURATION: 96 % | SYSTOLIC BLOOD PRESSURE: 108 MMHG | HEIGHT: 67 IN

## 2023-06-16 DIAGNOSIS — E11.9 TYPE 2 DIABETES MELLITUS WITHOUT COMPLICATION, WITHOUT LONG-TERM CURRENT USE OF INSULIN: ICD-10-CM

## 2023-06-16 DIAGNOSIS — E78.2 MIXED HYPERLIPIDEMIA: Primary | ICD-10-CM

## 2023-06-16 DIAGNOSIS — I25.119 CORONARY ARTERY DISEASE INVOLVING NATIVE HEART WITH ANGINA PECTORIS, UNSPECIFIED VESSEL OR LESION TYPE: ICD-10-CM

## 2023-06-16 DIAGNOSIS — Z12.5 PROSTATE CANCER SCREENING: ICD-10-CM

## 2023-06-16 DIAGNOSIS — H26.9 CATARACT OF BOTH EYES, UNSPECIFIED CATARACT TYPE: ICD-10-CM

## 2023-06-16 DIAGNOSIS — N40.1 BENIGN PROSTATIC HYPERPLASIA WITH LOWER URINARY TRACT SYMPTOMS, SYMPTOM DETAILS UNSPECIFIED: ICD-10-CM

## 2023-06-16 DIAGNOSIS — E55.9 VITAMIN D DEFICIENCY: ICD-10-CM

## 2023-06-16 DIAGNOSIS — I10 PRIMARY HYPERTENSION: ICD-10-CM

## 2023-06-16 RX ORDER — TAMSULOSIN HYDROCHLORIDE 0.4 MG/1
0.4 CAPSULE ORAL 2 TIMES DAILY
Qty: 90 CAPSULE | Refills: 1 | Status: SHIPPED | OUTPATIENT
Start: 2023-06-16

## 2023-06-16 RX ORDER — CARVEDILOL 12.5 MG/1
12.5 TABLET ORAL 2 TIMES DAILY WITH MEALS
Qty: 180 TABLET | Refills: 1 | Status: SHIPPED | OUTPATIENT
Start: 2023-06-16

## 2023-06-16 RX ORDER — CARVEDILOL 12.5 MG/1
12.5 TABLET ORAL 2 TIMES DAILY WITH MEALS
COMMUNITY
End: 2023-06-16 | Stop reason: SDUPTHER

## 2023-06-16 RX ORDER — CLOPIDOGREL BISULFATE 75 MG/1
75 TABLET ORAL DAILY
Qty: 90 TABLET | Refills: 1 | Status: SHIPPED | OUTPATIENT
Start: 2023-06-16

## 2023-06-16 RX ORDER — ATORVASTATIN CALCIUM 80 MG/1
80 TABLET, FILM COATED ORAL NIGHTLY
Qty: 90 TABLET | Refills: 1 | Status: SHIPPED | OUTPATIENT
Start: 2023-06-16

## 2023-06-16 RX ORDER — ISOSORBIDE MONONITRATE 30 MG/1
30 TABLET, EXTENDED RELEASE ORAL DAILY
Qty: 90 TABLET | Refills: 1 | Status: SHIPPED | OUTPATIENT
Start: 2023-06-16

## 2023-06-16 RX ORDER — LISINOPRIL 10 MG/1
10 TABLET ORAL DAILY
Qty: 90 TABLET | Refills: 1 | Status: SHIPPED | OUTPATIENT
Start: 2023-06-16

## 2023-06-16 NOTE — PROGRESS NOTES
"Chief Complaint  Establish Care and Hypertension    Subjective          Lamin Martinez presents to [facility] to establish care. Pt had 5 cardiac stents placed last year.  He had went to [facility] ER and was sent to [facility]. He did 3 month rehab after discharge, but pt is c/o chest pain, soa with exertion and weakness. Denies edema.  Patient sees cardiologist every 6 months.  Patient also sees [facility] kidney specialist every year.  He is seeing Vy Whiting for his type 2 diabetes.  He has a follow-up with her soon.  He is taking Flomax BID for bph. Medication has helped. He has been to urologist once and follow-up was not needed as symptoms are controlled.  Patient declines colonoscopy at this time.    Patient states that he does have bilateral cataracts that are needing to be addressed.  At this time he does not have insurance coverage.    Medical, surgical, family social history reviewed and updated in chart.    Objective   Vital Signs:   Vitals:    06/16/23 1501   BP: 108/67   BP Location: Left arm   Patient Position: Sitting   Cuff Size: Large Adult   Pulse: 62   Temp: 98.3 °F (36.8 °C)   TempSrc: Oral   SpO2: 96%   Weight: 95.4 kg (210 lb 6.4 oz)   Height: 170.2 cm (67\")       Physical Exam  Vitals reviewed.   Constitutional:       General: He is not in acute distress.     Appearance: Normal appearance. He is well-developed.   HENT:      Head: Normocephalic and atraumatic.   Eyes:      Conjunctiva/sclera: Conjunctivae normal.      Pupils: Pupils are equal, round, and reactive to light.   Neck:      Vascular: No carotid bruit.   Cardiovascular:      Rate and Rhythm: Normal rate and regular rhythm.      Heart sounds: Normal heart sounds. No murmur heard.  Pulmonary:      Effort: Pulmonary effort is normal. No respiratory distress.      Breath sounds: Normal breath sounds.   Skin:     General: Skin is warm and dry.   Neurological:      Mental Status: He is alert and oriented to " person, place, and time.   Psychiatric:         Mood and Affect: Mood and affect normal.         Behavior: Behavior normal.         Thought Content: Thought content normal.         Judgment: Judgment normal.        Result Review :              Assessment and Plan    Diagnoses and all orders for this visit:    1. Mixed hyperlipidemia (Primary)  Comments:  Continue atorvastatin 80 mg daily.  Will adjust medication if needed.  Diet modifications and routine exercise encouraged.  Orders:  -     atorvastatin (LIPITOR) 80 MG tablet; Take 1 tablet by mouth Every Night.  Dispense: 90 tablet; Refill: 1    2. Primary hypertension  Comments:  Stable.  Continue Coreg 12.5 mg twice daily, lisinopril 10 mg daily.  Orders:  -     Comprehensive Metabolic Panel; Future  -     Lipid Panel; Future  -     TSH Rfx On Abnormal To Free T4; Future  -     CBC Auto Differential; Future  -     carvedilol (COREG) 12.5 MG tablet; Take 1 tablet by mouth 2 (Two) Times a Day With Meals.  Dispense: 180 tablet; Refill: 1  -     lisinopril (PRINIVIL,ZESTRIL) 10 MG tablet; Take 1 tablet by mouth Daily.  Dispense: 90 tablet; Refill: 1    3. Type 2 diabetes mellitus without complication, without long-term current use of insulin  Comments:  Continue scheduled follow-up with EFRAIN Martinez.   Orders:  -     Microalbumin / Creatinine Urine Ratio - Urine, Clean Catch; Future    4. Vitamin D deficiency  Comments:  Continue vitamin D 5000 units daily.  Will adjust medication if needed.  Orders:  -     Vitamin D,25-Hydroxy; Future  -     vitamin D3 125 MCG (5000 UT) capsule capsule; Take 1 capsule by mouth Daily.  Dispense: 90 capsule; Refill: 1    5. Benign prostatic hyperplasia with lower urinary tract symptoms, symptom details unspecified  Comments:  Controlled.  Continue Flomax 0.4 mg twice daily.  PSA screening ordered.  Orders:  -     tamsulosin (FLOMAX) 0.4 MG capsule 24 hr capsule; Take 1 capsule by mouth 2 (Two) Times a Day.  Dispense: 90 capsule;  Refill: 1    6. Prostate cancer screening  -     PSA SCREENING; Future    7. Coronary artery disease involving native heart with angina pectoris, unspecified vessel or lesion type  Comments:  Follow-up with cardiology as scheduled.  Go to ED immediately with any chest pain.  Orders:  -     clopidogrel (PLAVIX) 75 MG tablet; Take 1 tablet by mouth Daily.  Dispense: 90 tablet; Refill: 1  -     isosorbide mononitrate (IMDUR) 30 MG 24 hr tablet; Take 1 tablet by mouth Daily.  Dispense: 90 tablet; Refill: 1    8. Cataract of both eyes, unspecified cataract type  Comments:  Patient is to contact Medicare  to see what type of coverage he needs for vision insurance.    Patient to notify office with any acute concerns or issues.  Patient verbalizes understanding, agrees with plan of care and has no further questions upon discharge.    Please note that portions of this note were completed with a voice recognition program.    Follow Up    Return in about 6 months (around 12/16/2023).  Patient was given instructions and counseling regarding his condition or for health maintenance advice. Please see specific information pulled into the AVS if appropriate.

## 2023-07-20 ENCOUNTER — TELEPHONE (OUTPATIENT)
Dept: DIABETES SERVICES | Facility: HOSPITAL | Age: 73
End: 2023-07-20
Payer: MEDICARE

## 2023-07-20 NOTE — TELEPHONE ENCOUNTER
Patient called in to see if Michelle has discussed him taking farxiga with Kidney provider, let patient know I would ask Michelle and call him back.

## 2023-07-21 NOTE — TELEPHONE ENCOUNTER
Called and spoke with office who stated they are sending a message to provider and they will call us back if it is okay to start farxiga.

## 2023-07-21 NOTE — TELEPHONE ENCOUNTER
Can you call [facility] [phone] I left a message the other day to ask if we could start him on farxiga 5mg. His gfr is 36.

## 2023-08-01 ENCOUNTER — OFFICE VISIT (OUTPATIENT)
Dept: FAMILY MEDICINE CLINIC | Age: 73
End: 2023-08-01
Payer: MEDICARE

## 2023-08-01 VITALS
BODY MASS INDEX: 32.83 KG/M2 | OXYGEN SATURATION: 99 % | HEART RATE: 55 BPM | TEMPERATURE: 97.7 F | SYSTOLIC BLOOD PRESSURE: 119 MMHG | DIASTOLIC BLOOD PRESSURE: 57 MMHG | HEIGHT: 67 IN | WEIGHT: 209.2 LBS

## 2023-08-01 DIAGNOSIS — E78.2 MIXED HYPERLIPIDEMIA: ICD-10-CM

## 2023-08-01 DIAGNOSIS — R20.0 NUMBNESS OF RIGHT HAND: ICD-10-CM

## 2023-08-01 DIAGNOSIS — E11.65 UNCONTROLLED TYPE 2 DIABETES MELLITUS WITH HYPERGLYCEMIA: ICD-10-CM

## 2023-08-01 DIAGNOSIS — Z12.11 COLON CANCER SCREENING: Primary | ICD-10-CM

## 2023-08-01 RX ORDER — DAPAGLIFLOZIN 5 MG/1
5 TABLET, FILM COATED ORAL DAILY
Qty: 90 TABLET | Refills: 0 | COMMUNITY
Start: 2023-08-01 | End: 2023-08-03 | Stop reason: SDUPTHER

## 2023-08-01 RX ORDER — ATORVASTATIN CALCIUM 40 MG/1
40 TABLET, FILM COATED ORAL NIGHTLY
Qty: 90 TABLET | Refills: 1 | Status: SHIPPED | OUTPATIENT
Start: 2023-08-01

## 2023-08-01 RX ORDER — ATORVASTATIN CALCIUM 80 MG/1
80 TABLET, FILM COATED ORAL NIGHTLY
Qty: 90 TABLET | Refills: 1 | Status: SHIPPED | OUTPATIENT
Start: 2023-08-01 | End: 2023-08-01 | Stop reason: SDUPTHER

## 2023-08-01 NOTE — PROGRESS NOTES
"Chief Complaint  Hyperlipidemia (Follow up)    Subjective        Lamin Martinez presents to [facility] for  routine follow up. He reports leg pain when walking. He described it as muscle ache. He also stated that he often has to stop and take breaks with ALDs but denies shortness of breath. He sees cardiology routinely.  Patient is currently taking atorvastatin 80 mg daily.  Cholesterol is well below normal limits.  Patient reported right hand numbness for approximately a year. He stated that he has been seen by Allie and Kleinert. Denies chest pain, shortness of breath, or palpitations.   Objective   Vital Signs:  /57 (BP Location: Right arm, Patient Position: Sitting, Cuff Size: Adult)   Pulse 55   Temp 97.7 øF (36.5 øC) (Oral)   Ht 170.2 cm (67.01\")   Wt 94.9 kg (209 lb 3.2 oz)   SpO2 99%   BMI 32.76 kg/mý   Estimated body mass index is 32.76 kg/mý as calculated from the following:    Height as of this encounter: 170.2 cm (67.01\").    Weight as of this encounter: 94.9 kg (209 lb 3.2 oz).             Physical Exam  Constitutional:       Appearance: Normal appearance.   HENT:      Head: Normocephalic.      Right Ear: External ear normal.      Left Ear: External ear normal.      Nose: Nose normal.   Eyes:      Pupils: Pupils are equal, round, and reactive to light.   Cardiovascular:      Rate and Rhythm: Normal rate and regular rhythm.      Pulses: Normal pulses.      Heart sounds: Normal heart sounds.   Pulmonary:      Effort: Pulmonary effort is normal.      Breath sounds: Normal breath sounds.   Musculoskeletal:         General: Normal range of motion.      Cervical back: Normal range of motion.   Skin:     General: Skin is warm and dry.   Neurological:      Mental Status: He is alert and oriented to person, place, and time.   Psychiatric:         Mood and Affect: Mood normal.         Behavior: Behavior normal.         Thought Content: Thought content normal.        "  Judgment: Judgment normal.      Result Review :                   Assessment and Plan   Diagnoses and all orders for this visit:    1. Colon cancer screening (Primary)  Comments:  scheduling colonoscopy  Orders:  -     Ambulatory Referral For Screening Colonoscopy    2. Uncontrolled type 2 diabetes mellitus with hyperglycemia  Comments:  Stable, continue current medications.  Orders:  -     Discontinue: dapagliflozin (Farxiga) 5 MG tablet tablet; Take 1 tablet by mouth Daily.  Dispense: 90 tablet; Refill: 0    3. Mixed hyperlipidemia  Comments:  Atorvastatin decreased to 40 mg daily due to leg pain.  Diet modifications and routine exercise encouraged.  Orders:  -     Discontinue: atorvastatin (LIPITOR) 80 MG tablet; Take 1 tablet by mouth Every Night.  Dispense: 90 tablet; Refill: 1  -     atorvastatin (LIPITOR) 40 MG tablet; Take 1 tablet by mouth Every Night.  Dispense: 90 tablet; Refill: 1    4. Numbness of right hand  Comments:  Referral for physical therapy for nerve conduction test.  Orders:  -     Ambulatory Referral to Physical Therapy Evaluate and treat (right hand numbness)  -     Nerve Conduction Test; Future    Patient to notify office with any acute concerns or issues.  Patient verbalizes understanding, agrees with plan of care and has no further questions upon discharge.    Please note that portions of this note were completed with a voice recognition program.         Follow Up   Return in about 4 months (around 12/1/2023) for Medicare Wellness.  Patient was given instructions and counseling regarding his condition or for health maintenance advice. Please see specific information pulled into the AVS if appropriate.       Also seen by Dot Meyers RN, NP student

## 2023-08-03 DIAGNOSIS — E11.65 UNCONTROLLED TYPE 2 DIABETES MELLITUS WITH HYPERGLYCEMIA: ICD-10-CM

## 2023-08-03 RX ORDER — DAPAGLIFLOZIN 5 MG/1
5 TABLET, FILM COATED ORAL DAILY
Qty: 90 TABLET | Refills: 1 | Status: SHIPPED | OUTPATIENT
Start: 2023-08-03

## 2023-08-10 NOTE — PROGRESS NOTES
Chief Complaint  Diabetes (Follow up, no devices )    Referred By: EFRAIN Vizcaino    Subjective          Lamin Martinez presents to [facility] for diabetes medication management    History of Present Illness    Visit type:  follow-up  Diabetes type:  Type 2  Current diabetes status/concerns/issues:  Reports the farxiga is working well. Denies any side effects from the medication. He brought a blood sugar log and his blood sugar is running . States he has tried not eating out.   Other health concerns: reports his lipitor was decreased from 80mg qd to 40mg qd due to leg cramps. States he hsa been having more fatigue. Reports he had an echo recently that was normal. He is trying to get his cataract surgery done but they will not schedule it until he gets his A1c down. He is a  and states he needs to go back to work.  Current Diabetes symptoms:    Polyuria: No   Polydipsia: No   Polyphagia: No   Blurred vision: Yes     Excessive fatigue: Yes    Known Diabetes complications:  Neuropathy: None; Location: N/A  Renal: Stage IIIb moderate (GFR = 30-44 mL/min. He is followed by Selma ZUNIGA at [facility]  Eyes: None; Location: N/A  Amputation/Wounds: None  GI: None  Cardiovascular: Hypertension, Hyperlipidemia, CAD and Other: History of 5 cardiac stents  ED: Patient Reported  Other: None  Hypoglycemia:  None reported at this time  Hypoglycemia Symptoms:  No hypoglycemia at this time  Current diabetes treatment:  Actos 15mg bid, Glipizide 10mg bid, farxiga 5mg qd.     Blood glucose device:  Meter  Blood glucose monitoring frequency:  1  Blood glucose range/average:     Glucose Source: BG Logs  Diet:  Limits high carb/sweet foods, Avoids sugary drinks, History of Diabetes Nutrition Counseling - YES. States he is eating a lot of fruits and salads and avoiding potatoes, pasta, bread or rice  Activity/Exercise:   he walks several days wk short  distances     Past Medical History:   Diagnosis Date    CAD (coronary artery disease)     Diabetes     Enlarged prostate     History of cardiac cath     Hypertension      Past Surgical History:   Procedure Laterality Date    CORONARY ANGIOPLASTY WITH STENT PLACEMENT      5 stents placed     TONSILLECTOMY       Family History   Problem Relation Age of Onset    Diabetes Sister     Diabetes Brother      Social History     Socioeconomic History    Marital status: Single   Tobacco Use    Smoking status: Former     Packs/day: 2.00     Years: 3.00     Pack years: 6.00     Types: Cigarettes     Start date: 2019     Quit date: 2022     Years since quittin.6    Smokeless tobacco: Never   Vaping Use    Vaping Use: Never used   Substance and Sexual Activity    Alcohol use: Yes     Comment: occasionally    Drug use: Never     No Known Allergies    Current Outpatient Medications:     Ascorbic Acid 500 MG chewable tablet, Chew 500 mg Daily., Disp: , Rfl:     aspirin 81 MG chewable tablet, Daily., Disp: , Rfl:     atorvastatin (LIPITOR) 40 MG tablet, Take 1 tablet by mouth Every Night., Disp: 90 tablet, Rfl: 1    Blood Glucose Monitoring Suppl device, Use as directed for blood glucose monitoring, Disp: 1 each, Rfl: 0    carvedilol (COREG) 12.5 MG tablet, Take 1 tablet by mouth 2 (Two) Times a Day With Meals. (Patient taking differently: Take 2 tablets by mouth 2 (Two) Times a Day With Meals.), Disp: 180 tablet, Rfl: 1    clopidogrel (PLAVIX) 75 MG tablet, Take 1 tablet by mouth Daily., Disp: 90 tablet, Rfl: 1    dapagliflozin (Farxiga) 5 MG tablet tablet, Take 1 tablet by mouth Daily., Disp: 90 tablet, Rfl: 1    glipizide (GLUCOTROL XL) 10 MG 24 hr tablet, Take 1 tablet by mouth 2 (Two) Times a Day for 180 days., Disp: 180 tablet, Rfl: 1    glucose blood test strip, Test blood glucose 1 time each day, Disp: 100 each, Rfl: 5    isosorbide mononitrate (IMDUR) 30 MG 24 hr tablet, Take 1 tablet by mouth Daily., Disp: 90  "tablet, Rfl: 1    Lancets misc, Test blood glucose 1 time each day, Disp: 100 each, Rfl: 5    lisinopril (PRINIVIL,ZESTRIL) 10 MG tablet, Take 1 tablet by mouth Daily., Disp: 90 tablet, Rfl: 1    multivitamin (THERAGRAN) tablet tablet, Take 1 tablet by mouth Daily., Disp: , Rfl:     pioglitazone (ACTOS) 15 MG tablet, Take 1 tablet by mouth Daily for 180 days. (Patient taking differently: Take 1 tablet by mouth 2 (Two) Times a Day.), Disp: 90 tablet, Rfl: 1    tamsulosin (FLOMAX) 0.4 MG capsule 24 hr capsule, Take 1 capsule by mouth 2 (Two) Times a Day., Disp: 90 capsule, Rfl: 1    vitamin D3 125 MCG (5000 UT) capsule capsule, Take 1 capsule by mouth Daily., Disp: 90 capsule, Rfl: 1    Objective     Vitals:    08/11/23 1520   BP: 132/58   BP Location: Right arm   Patient Position: Sitting   Cuff Size: Adult   Pulse: 66   SpO2: 92%   Weight: 97.1 kg (214 lb)   Height: 170.2 cm (67.01\")     Body mass index is 33.51 kg/mý.    Physical Exam  Constitutional:       Appearance: Normal appearance. He is normal weight. He is obese.      Comments: Obesity (BMI 30 - 39.9) Pt Current BMI = 33.51      HENT:      Head: Normocephalic and atraumatic.      Right Ear: External ear normal.      Left Ear: External ear normal.      Nose: Nose normal.   Eyes:      Extraocular Movements: Extraocular movements intact.      Conjunctiva/sclera: Conjunctivae normal.   Pulmonary:      Effort: Pulmonary effort is normal.   Musculoskeletal:         General: Normal range of motion.      Cervical back: Normal range of motion.   Skin:     General: Skin is warm and dry.   Neurological:      General: No focal deficit present.      Mental Status: He is alert and oriented to person, place, and time. Mental status is at baseline.   Psychiatric:         Mood and Affect: Mood normal.         Behavior: Behavior normal.         Thought Content: Thought content normal.         Judgment: Judgment normal.       Result Review :   The following data was reviewed " by: Michelle Walters, APRN on 08/11/2023:    Most Recent A1C          8/11/2023    15:51   HGBA1C Most Recent   Hemoglobin A1C 7.4        A1C Last 3 Results          4/20/2023    13:21 7/14/2023    13:36 8/11/2023    15:51   HGBA1C Last 3 Results   Hemoglobin A1C 9.3  8.1  7.4      A1c collected 8/11/23 is 7.4%, indicating Uncontrolled Type II diabetes.  This result is down from the prior result of 8.1% collected on 7/14/23     Glucose   Date Value Ref Range Status   08/11/2023 105 (H) 70 - 99 mg/dL Final     Comment:     Serial Number: [id number]:  [id number]                                     Point of care glucose in the office today is within normal limits for nonfasting glucose    Creatinine   Date Value Ref Range Status   06/20/2023 1.93 (H) 0.76 - 1.27 mg/dL Final     eGFR   Date Value Ref Range Status   06/20/2023 36.1 (L) >60.0 mL/min/1.73 Final     Labs collected on 6/20/23 show Stage IIIb moderate (GFR = 30-44 mL/min    Microalbumin, Urine   Date Value Ref Range Status   06/20/2023 3.5 mg/dL Final     Creatinine, Urine   Date Value Ref Range Status   06/20/2023 107.9 mg/dL Final     Microalbumin/Creatinine Ratio   Date Value Ref Range Status   06/20/2023 32.4 mg/g Final     Urine microalbuminuria collected on 6/20/23 is positive for microalbuminuria    Total Cholesterol   Date Value Ref Range Status   06/20/2023 99 0 - 200 mg/dL Final     Triglycerides   Date Value Ref Range Status   06/20/2023 139 0 - 150 mg/dL Final     HDL Cholesterol   Date Value Ref Range Status   06/20/2023 33 (L) 40 - 60 mg/dL Final     LDL Cholesterol    Date Value Ref Range Status   06/20/2023 42 0 - 100 mg/dL Final     Lipid panel collected on 6/20/23 shows low HDL            Assessment: Pt is here today for a 1 month follow up on his diabetes. Last visit Farxiga 5mg once daily was prescribed. States he is tolerating it well without any side effects. He brought a blood sugar log to his appt today and his readings are  ranging from . His al1 in the office today is 7.4% which is down from his previous A1c of 8.1% in July and 9.3% in April. He is hoping his A1c is down low enough to get his cataract surgery done. He is a  and has not been driving due to blurred vision.       Diagnoses and all orders for this visit:    1. Uncontrolled type 2 diabetes mellitus with hyperglycemia (Primary)  -     glucose blood test strip; Test blood glucose 1 time each day  Dispense: 100 each; Refill: 5  -     Lancets misc; Test blood glucose 1 time each day  Dispense: 100 each; Refill: 5  -     POC Glycosylated Hemoglobin (Hb A1C)    2. Type 2 diabetes mellitus with stage 3b chronic kidney disease, without long-term current use of insulin    3. Obesity (BMI 30-39.9)    Other orders  -     POC Glucose        Plan:  No changes were made to his plan of care today. Instructed pt to keep up the good work. Scheduled a 3 month follow up    The patient will monitor his blood glucose levels once daily.  If he develops problematic hyperglycemia or hypoglycemia or adverse drug reactions, he will contact the office for further instructions.        Follow Up     Return in about 3 months (around 11/11/2023) for Medication Mgmt.    Patient was given instructions and counseling regarding his condition or for health maintenance advice. Please see specific information pulled into the AVS if appropriate.     Michelle Walters, EFRAIN  08/11/2023      Dictated Utilizing Dragon Dictation.  Please note that portions of this note were completed with a voice recognition program.  Part of this note may be an electronic transcription/translation of spoken language to printed text using the Dragon Dictation System.

## 2023-08-11 ENCOUNTER — OFFICE VISIT (OUTPATIENT)
Dept: DIABETES SERVICES | Facility: HOSPITAL | Age: 73
End: 2023-08-11
Payer: MEDICARE

## 2023-08-11 ENCOUNTER — TELEPHONE (OUTPATIENT)
Dept: DIABETES SERVICES | Facility: HOSPITAL | Age: 73
End: 2023-08-11
Payer: MEDICARE

## 2023-08-11 VITALS
DIASTOLIC BLOOD PRESSURE: 58 MMHG | SYSTOLIC BLOOD PRESSURE: 132 MMHG | OXYGEN SATURATION: 92 % | WEIGHT: 214 LBS | BODY MASS INDEX: 33.59 KG/M2 | HEIGHT: 67 IN | HEART RATE: 66 BPM

## 2023-08-11 DIAGNOSIS — N18.32 TYPE 2 DIABETES MELLITUS WITH STAGE 3B CHRONIC KIDNEY DISEASE, WITHOUT LONG-TERM CURRENT USE OF INSULIN: ICD-10-CM

## 2023-08-11 DIAGNOSIS — E11.65 UNCONTROLLED TYPE 2 DIABETES MELLITUS WITH HYPERGLYCEMIA: Primary | ICD-10-CM

## 2023-08-11 DIAGNOSIS — E11.22 TYPE 2 DIABETES MELLITUS WITH STAGE 3B CHRONIC KIDNEY DISEASE, WITHOUT LONG-TERM CURRENT USE OF INSULIN: ICD-10-CM

## 2023-08-11 DIAGNOSIS — E66.9 OBESITY (BMI 30-39.9): ICD-10-CM

## 2023-08-11 LAB
EXPIRATION DATE: ABNORMAL
GLUCOSE BLDC GLUCOMTR-MCNC: 105 MG/DL (ref 70–99)
HBA1C MFR BLD: 7.4 %
Lab: ABNORMAL

## 2023-08-11 PROCEDURE — 1160F RVW MEDS BY RX/DR IN RCRD: CPT | Performed by: NURSE PRACTITIONER

## 2023-08-11 PROCEDURE — 3075F SYST BP GE 130 - 139MM HG: CPT | Performed by: NURSE PRACTITIONER

## 2023-08-11 PROCEDURE — 82948 REAGENT STRIP/BLOOD GLUCOSE: CPT | Performed by: NURSE PRACTITIONER

## 2023-08-11 PROCEDURE — 99213 OFFICE O/P EST LOW 20 MIN: CPT | Performed by: NURSE PRACTITIONER

## 2023-08-11 PROCEDURE — G0463 HOSPITAL OUTPT CLINIC VISIT: HCPCS | Performed by: NURSE PRACTITIONER

## 2023-08-11 PROCEDURE — 1159F MED LIST DOCD IN RCRD: CPT | Performed by: NURSE PRACTITIONER

## 2023-08-11 PROCEDURE — 3078F DIAST BP <80 MM HG: CPT | Performed by: NURSE PRACTITIONER

## 2023-08-11 PROCEDURE — 3051F HG A1C>EQUAL 7.0%<8.0%: CPT | Performed by: NURSE PRACTITIONER

## 2023-08-11 RX ORDER — LANCETS 30 GAUGE
EACH MISCELLANEOUS
Qty: 100 EACH | Refills: 5 | Status: SHIPPED | OUTPATIENT
Start: 2023-08-11

## 2023-08-11 NOTE — TELEPHONE ENCOUNTER
Pt was seen in office today and was inquiring the status of his patient assistance regarding Farxiga, Rep stated that the form that was submitted was not an updated application, Rep stated that they sent to the wrong fax number of [phone], instead of [phone]. I requested that correct form be faxed urgent so that I may get this completed for the patient. As soon as I receive the application I will re send and notify patient of update.

## 2023-08-14 ENCOUNTER — PATIENT MESSAGE (OUTPATIENT)
Dept: DIABETES SERVICES | Facility: HOSPITAL | Age: 73
End: 2023-08-14
Payer: MEDICARE

## 2023-10-11 ENCOUNTER — TELEPHONE (OUTPATIENT)
Dept: FAMILY MEDICINE CLINIC | Age: 73
End: 2023-10-11

## 2023-10-11 NOTE — TELEPHONE ENCOUNTER
"    Caller: Lamin Martinez \"Paul\"    Relationship to patient: Self    Best call back number:     [phone] (Mobile)       Patient is needing: PATIENT CALLED IN AND SAID HIS HAND HAD BEEN NUMB FOR A YEAR AND HE WOULD LIKE TO GET HIS RIGHT HAND X-RAYED TO CHECK FOR CARPEL TUNNEL. PATIENT SAID IT IS OKAY TO LEAVE VOICEMAIL IF NEEDED          " Called the patient, no answer, left a message for him to call the office back.

## 2023-10-13 ENCOUNTER — OFFICE VISIT (OUTPATIENT)
Dept: FAMILY MEDICINE CLINIC | Age: 73
End: 2023-10-13
Payer: MEDICARE

## 2023-10-13 VITALS
BODY MASS INDEX: 32.49 KG/M2 | HEART RATE: 66 BPM | WEIGHT: 207 LBS | DIASTOLIC BLOOD PRESSURE: 60 MMHG | OXYGEN SATURATION: 97 % | HEIGHT: 67 IN | SYSTOLIC BLOOD PRESSURE: 104 MMHG

## 2023-10-13 DIAGNOSIS — M25.531 RIGHT WRIST PAIN: Primary | ICD-10-CM

## 2023-10-13 DIAGNOSIS — M79.641 RIGHT HAND PAIN: ICD-10-CM

## 2023-10-13 PROCEDURE — 3074F SYST BP LT 130 MM HG: CPT | Performed by: NURSE PRACTITIONER

## 2023-10-13 PROCEDURE — 1159F MED LIST DOCD IN RCRD: CPT | Performed by: NURSE PRACTITIONER

## 2023-10-13 PROCEDURE — 99213 OFFICE O/P EST LOW 20 MIN: CPT | Performed by: NURSE PRACTITIONER

## 2023-10-13 PROCEDURE — 3078F DIAST BP <80 MM HG: CPT | Performed by: NURSE PRACTITIONER

## 2023-10-13 PROCEDURE — 1160F RVW MEDS BY RX/DR IN RCRD: CPT | Performed by: NURSE PRACTITIONER

## 2023-10-13 PROCEDURE — 3051F HG A1C>EQUAL 7.0%<8.0%: CPT | Performed by: NURSE PRACTITIONER

## 2023-10-13 NOTE — PROGRESS NOTES
"Chief Complaint  Wrist Pain (Right wrist and hand pain, [facility]    Subjective          Lamin Martinez presents to [facility] requesting another referral to physical therapy. Pt was unable to go to [facility] and was told that he needs another referral. Denies any new injury. Still is having numbness and pain to right wrist/hand.       Objective   Vital Signs:   Vitals:    10/13/23 1238   BP: 104/60   BP Location: Right arm   Patient Position: Sitting   Cuff Size: Large Adult   Pulse: 66   SpO2: 97%   Weight: 93.9 kg (207 lb)   Height: 170.2 cm (67.01\")       Physical Exam  Vitals reviewed.   Constitutional:       General: He is not in acute distress.     Appearance: Normal appearance. He is well-developed.   HENT:      Head: Normocephalic and atraumatic.   Eyes:      Conjunctiva/sclera: Conjunctivae normal.      Pupils: Pupils are equal, round, and reactive to light.   Pulmonary:      Effort: Pulmonary effort is normal. No respiratory distress.   Musculoskeletal:         General: No tenderness or signs of injury. Normal range of motion.   Skin:     General: Skin is warm and dry.   Neurological:      Mental Status: He is alert and oriented to person, place, and time.   Psychiatric:         Mood and Affect: Mood and affect normal.         Behavior: Behavior normal.         Thought Content: Thought content normal.         Judgment: Judgment normal.          Result Review :              Assessment and Plan    Diagnoses and all orders for this visit:    1. Right wrist pain (Primary)  -     Ambulatory Referral to Physical Therapy Evaluate and treat (right wrist pain and nerve conduction test)    2. Right hand pain  -     Ambulatory Referral to Physical Therapy Evaluate and treat (right wrist pain and nerve conduction test)    Patient to notify office with any acute concerns or issues.  Patient verbalizes understanding, agrees with plan of care " and has no further questions upon discharge.    Please note that portions of this note were completed with a voice recognition program.    Follow Up    Return for Next scheduled follow up.  Patient was given instructions and counseling regarding his condition or for health maintenance advice. Please see specific information pulled into the AVS if appropriate.

## 2023-10-19 DIAGNOSIS — M25.531 RIGHT WRIST PAIN: Primary | ICD-10-CM

## 2023-10-19 DIAGNOSIS — R53.1 WEAKNESS: ICD-10-CM

## 2023-11-01 DIAGNOSIS — R53.1 WEAKNESS: ICD-10-CM

## 2023-11-01 DIAGNOSIS — M25.531 RIGHT WRIST PAIN: ICD-10-CM

## 2023-11-14 ENCOUNTER — OFFICE VISIT (OUTPATIENT)
Dept: ORTHOPEDIC SURGERY | Facility: CLINIC | Age: 73
End: 2023-11-14
Payer: MEDICARE

## 2023-11-14 VITALS
HEART RATE: 64 BPM | WEIGHT: 217.6 LBS | SYSTOLIC BLOOD PRESSURE: 149 MMHG | OXYGEN SATURATION: 95 % | DIASTOLIC BLOOD PRESSURE: 74 MMHG | HEIGHT: 67 IN | BODY MASS INDEX: 34.15 KG/M2

## 2023-11-14 DIAGNOSIS — G56.01 CARPAL TUNNEL SYNDROME OF RIGHT WRIST: Primary | ICD-10-CM

## 2023-11-14 PROBLEM — G56.02 CARPAL TUNNEL SYNDROME OF LEFT WRIST: Status: ACTIVE | Noted: 2023-11-14

## 2023-11-14 PROCEDURE — 3078F DIAST BP <80 MM HG: CPT | Performed by: ORTHOPAEDIC SURGERY

## 2023-11-14 PROCEDURE — 3077F SYST BP >= 140 MM HG: CPT | Performed by: ORTHOPAEDIC SURGERY

## 2023-11-14 PROCEDURE — 99204 OFFICE O/P NEW MOD 45 MIN: CPT | Performed by: ORTHOPAEDIC SURGERY

## 2023-11-14 NOTE — PROGRESS NOTES
"Chief Complaint  Initial Evaluation of the Right Hand     Subjective      Lamin Martinez presents to [facility] for initial evaluation of the right hand. He notes numbness and tingling for about a year.  He wears braces that give support.  He has had an injection by [employer] [employer] in the past that gave little relief.  He had a EMG and here to review. He has pain with sleeping and difficulty with FMC and  strength.       No Known Allergies     Social History     Socioeconomic History    Marital status: Single   Tobacco Use    Smoking status: Former     Packs/day: 2.00     Years: 3.00     Additional pack years: 0.00     Total pack years: 6.00     Types: Cigarettes     Start date: 2019     Quit date: 2022     Years since quittin.8    Smokeless tobacco: Never   Vaping Use    Vaping Use: Never used   Substance and Sexual Activity    Alcohol use: Yes     Comment: occasionally    Drug use: Never        I reviewed the patient's chief complaint, history of present illness, review of systems, past medical history, surgical history, family history, social history, medications, and allergy list.     Review of Systems     Constitutional: Denies fevers, chills, weight loss  Cardiovascular: Denies chest pain, shortness of breath  Skin: Denies rashes, acute skin changes  Neurologic: Denies headache, loss of consciousness        Vital Signs:   /74 (BP Location: Right arm, Patient Position: Sitting, Cuff Size: Adult)   Pulse 64   Ht 170.2 cm (67.01\")   Wt 98.7 kg (217 lb 9.6 oz)   SpO2 95%   BMI 34.07 kg/m²          Physical Exam  General: Alert. No acute distress    Ortho Exam        RIGHT HAND Positive Compression testing/ Positive Tinels. NegativeFinkelsteins. Negative Rodriguez's testing. Negative CMC grind testing. Positive Phalens. Full ROM of the hand, fingers, elbow and wrist. Negative Triggering of the digit. Sensation grossly intact to light touch, median, radial " and ulnar nerve. Positive AIN, PIN and ulnar nerve motor function intact. Axillary nerve intact. Positive pulses.        Procedures      Imaging Results (Most Recent)       None             Result Review :     EMG 10/27/23  This is an abnormal study because of focal slowing of the median nerve across the wrists bilaterally.  Right more then left.     It is suggestive of a median nerve entrapment or Carpal Tunnel Syndrome. Right more then left.       Assessment and Plan     Diagnoses and all orders for this visit:    1. Carpal tunnel syndrome of right wrist (Primary)  -     Case Request; Standing  -     Follow Anesthesia Guidelines / Protocol; Standing  -     Nerve Block; Standing  -     Verify NPO Status; Standing  -     SCD (Sequential Compression Device) Place on Patient in Pre-Op; Standing  -     POC Glucose Once; Standing  -     Clip Operative Site; Standing  -     Obtain Informed Consent (If Not Done Inpatient or PAT); Standing  -     Instructions on coughing, deep breathing, and incentive spirometry.; Standing  -     ceFAZolin (ANCEF) 2 g in sodium chloride 0.9 % 100 mL IVPB  -     ceFAZolin (ANCEF) 3 g in sodium chloride 0.9 % 100 mL IVPB  -     Case Request        Discussed the treatment plan with the patient.     Discussed the treatment options with the patient, operative vs non-operative.     The patient expressed understanding and wished to proceed with a right carpal tunnel release.       Discussed surgery., Risks/benefits discussed with patient including, but not limited to: infection, bleeding, neurovascular damage, malunion, nonunion, aesthetic deformity, need for further surgery, and death., Surgery pamphlet given., and Call or return if worsening symptoms.    Follow Up     2 weeks postoperatively       Patient was given instructions and counseling regarding his condition or for health maintenance advice. Please see specific information pulled into the AVS if appropriate.     Scribed for Barbara MOORE  MD Michael by Mera Díaz MA.  11/14/23   14:27 EST    I have personally performed the services described in this document as scribed by the above individual and it is both accurate and complete. Barbara Rodriguez MD 11/14/23

## 2023-11-14 NOTE — H&P (VIEW-ONLY)
"Chief Complaint  Initial Evaluation of the Right Hand     Subjective      Lamin Martinez presents to [facility] for initial evaluation of the right hand. He notes numbness and tingling for about a year.  He wears braces that give support.  He has had an injection by [employer] [employer] in the past that gave little relief.  He had a EMG and here to review. He has pain with sleeping and difficulty with FMC and  strength.       No Known Allergies     Social History     Socioeconomic History    Marital status: Single   Tobacco Use    Smoking status: Former     Packs/day: 2.00     Years: 3.00     Additional pack years: 0.00     Total pack years: 6.00     Types: Cigarettes     Start date: 2019     Quit date: 2022     Years since quittin.8    Smokeless tobacco: Never   Vaping Use    Vaping Use: Never used   Substance and Sexual Activity    Alcohol use: Yes     Comment: occasionally    Drug use: Never        I reviewed the patient's chief complaint, history of present illness, review of systems, past medical history, surgical history, family history, social history, medications, and allergy list.     Review of Systems     Constitutional: Denies fevers, chills, weight loss  Cardiovascular: Denies chest pain, shortness of breath  Skin: Denies rashes, acute skin changes  Neurologic: Denies headache, loss of consciousness        Vital Signs:   /74 (BP Location: Right arm, Patient Position: Sitting, Cuff Size: Adult)   Pulse 64   Ht 170.2 cm (67.01\")   Wt 98.7 kg (217 lb 9.6 oz)   SpO2 95%   BMI 34.07 kg/m²          Physical Exam  General: Alert. No acute distress    Ortho Exam        RIGHT HAND Positive Compression testing/ Positive Tinels. NegativeFinkelsteins. Negative Rodriguez's testing. Negative CMC grind testing. Positive Phalens. Full ROM of the hand, fingers, elbow and wrist. Negative Triggering of the digit. Sensation grossly intact to light touch, median, radial " and ulnar nerve. Positive AIN, PIN and ulnar nerve motor function intact. Axillary nerve intact. Positive pulses.        Procedures      Imaging Results (Most Recent)       None             Result Review :     EMG 10/27/23  This is an abnormal study because of focal slowing of the median nerve across the wrists bilaterally.  Right more then left.     It is suggestive of a median nerve entrapment or Carpal Tunnel Syndrome. Right more then left.       Assessment and Plan     Diagnoses and all orders for this visit:    1. Carpal tunnel syndrome of right wrist (Primary)  -     Case Request; Standing  -     Follow Anesthesia Guidelines / Protocol; Standing  -     Nerve Block; Standing  -     Verify NPO Status; Standing  -     SCD (Sequential Compression Device) Place on Patient in Pre-Op; Standing  -     POC Glucose Once; Standing  -     Clip Operative Site; Standing  -     Obtain Informed Consent (If Not Done Inpatient or PAT); Standing  -     Instructions on coughing, deep breathing, and incentive spirometry.; Standing  -     ceFAZolin (ANCEF) 2 g in sodium chloride 0.9 % 100 mL IVPB  -     ceFAZolin (ANCEF) 3 g in sodium chloride 0.9 % 100 mL IVPB  -     Case Request        Discussed the treatment plan with the patient.     Discussed the treatment options with the patient, operative vs non-operative.     The patient expressed understanding and wished to proceed with a right carpal tunnel release.       Discussed surgery., Risks/benefits discussed with patient including, but not limited to: infection, bleeding, neurovascular damage, malunion, nonunion, aesthetic deformity, need for further surgery, and death., Surgery pamphlet given., and Call or return if worsening symptoms.    Follow Up     2 weeks postoperatively       Patient was given instructions and counseling regarding his condition or for health maintenance advice. Please see specific information pulled into the AVS if appropriate.     Scribed for Barbara MOORE  MD Michael by Mera Díaz MA.  11/14/23   14:27 EST    I have personally performed the services described in this document as scribed by the above individual and it is both accurate and complete. Barbara Rodriguez MD 11/14/23

## 2023-11-15 ENCOUNTER — TELEPHONE (OUTPATIENT)
Dept: DIABETES SERVICES | Facility: HOSPITAL | Age: 73
End: 2023-11-15
Payer: MEDICARE

## 2023-11-15 NOTE — TELEPHONE ENCOUNTER
Patient called into office and stated that he received 3 boxes of 5mg Farxiga in the mail today and he will start taking it today.

## 2023-11-27 ENCOUNTER — TELEPHONE (OUTPATIENT)
Dept: ORTHOPEDIC SURGERY | Facility: CLINIC | Age: 73
End: 2023-11-27
Payer: MEDICARE

## 2023-11-27 NOTE — TELEPHONE ENCOUNTER
PT CAME INTO OFFICE ASKING ABOUT HOW LONG THE SURGERY WILL LAST AT THE HOSPITAL THAT IS SCHEDULED FOR 11/29 SINCE HE HAS TO LET HIS TRANSPORTATION  KNOW. PT ASKING WHICH MEDICATIONS HE CAN AND CANNOT TAKE BEFORE SURGERY. PLEASE ADVISE AND CALL PT.

## 2023-11-28 ENCOUNTER — TELEPHONE (OUTPATIENT)
Dept: ORTHOPEDIC SURGERY | Facility: CLINIC | Age: 73
End: 2023-11-28
Payer: MEDICARE

## 2023-11-28 DIAGNOSIS — E55.9 VITAMIN D DEFICIENCY: ICD-10-CM

## 2023-11-28 RX ORDER — CARVEDILOL 25 MG/1
25 TABLET ORAL 2 TIMES DAILY WITH MEALS
COMMUNITY
Start: 2023-10-13

## 2023-11-28 NOTE — PRE-PROCEDURE INSTRUCTIONS
PATIENT INSTRUCTED TO BE:    - NPO AFTER MIDNIGHT EXCEPT CAN HAVE CLEAR LIQUIDS 2 HOURS PRIOR TO SURGERY ARRIVAL TIME     - TO HOLD ALL VITAMINS, SUPPLEMENTS, NSAIDS FOR ONE WEEK PRIOR TO THEIR SURGICAL PROCEDURE    - INSTRUCTED PT TO USE SURGICAL SOAP 1 TIME THE NIGHT PRIOR TO SURGERY OR THE AM OF SURGERY.   USE SOAP FROM NECK TO TOES AVOID THEIR FACE, HAIR, AND PRIVATE PARTS. INSTRUCTED NO LOTIONS, JEWELRY, PIERCINGS, OR DEODORANT DAY OF SURGERY    - IF DIABETIC, CHECK BLOOD GLUCOSE IF LESS THAN 70 CALL PREOP AREA -AM OF SURGERY [phone]    - INSTRUCTED TO TAKE THE FOLLOWING MEDICATIONS THE DAY OF SURGERY:   Carvedilol, Imdur, Tamsulosin    PATIENT VERBALIZED UNDERSTANDING

## 2023-11-28 NOTE — TELEPHONE ENCOUNTER
PATIENT WAS CALLED AND LMOM TO CALL ME IN THE OFFICE.  PER DR DOMINGO PATIENT CAN HOLD ASPIRIN FOR 3 DAYS AND PLAVIX FOR 7 DAYS PRIOR TO SURGERY.

## 2023-11-29 ENCOUNTER — HOSPITAL ENCOUNTER (OUTPATIENT)
Facility: HOSPITAL | Age: 73
Setting detail: HOSPITAL OUTPATIENT SURGERY
Discharge: HOME | End: 2023-11-29
Attending: ORTHOPAEDIC SURGERY | Admitting: ORTHOPAEDIC SURGERY
Payer: MEDICARE

## 2023-11-29 ENCOUNTER — ANESTHESIA (OUTPATIENT)
Dept: PERIOP | Facility: HOSPITAL | Age: 73
End: 2023-11-29
Payer: MEDICARE

## 2023-11-29 ENCOUNTER — ANESTHESIA EVENT (OUTPATIENT)
Dept: PERIOP | Facility: HOSPITAL | Age: 73
End: 2023-11-29
Payer: MEDICARE

## 2023-11-29 VITALS
BODY MASS INDEX: 32.18 KG/M2 | WEIGHT: 212.3 LBS | TEMPERATURE: 97.4 F | HEIGHT: 68 IN | HEART RATE: 54 BPM | SYSTOLIC BLOOD PRESSURE: 132 MMHG | RESPIRATION RATE: 16 BRPM | OXYGEN SATURATION: 96 % | DIASTOLIC BLOOD PRESSURE: 62 MMHG

## 2023-11-29 DIAGNOSIS — G56.01 CARPAL TUNNEL SYNDROME OF RIGHT WRIST: ICD-10-CM

## 2023-11-29 LAB
GLUCOSE BLDC GLUCOMTR-MCNC: 130 MG/DL (ref 70–99)
GLUCOSE BLDC GLUCOMTR-MCNC: 141 MG/DL (ref 70–99)

## 2023-11-29 PROCEDURE — 25010000002 ONDANSETRON PER 1 MG: Performed by: NURSE ANESTHETIST, CERTIFIED REGISTERED

## 2023-11-29 PROCEDURE — 64721 CARPAL TUNNEL SURGERY: CPT | Performed by: ORTHOPAEDIC SURGERY

## 2023-11-29 PROCEDURE — 25010000002 BUPIVACAINE (PF) 0.5 % SOLUTION: Performed by: ORTHOPAEDIC SURGERY

## 2023-11-29 PROCEDURE — 82948 REAGENT STRIP/BLOOD GLUCOSE: CPT

## 2023-11-29 PROCEDURE — 25010000002 KETOROLAC TROMETHAMINE PER 15 MG: Performed by: NURSE ANESTHETIST, CERTIFIED REGISTERED

## 2023-11-29 PROCEDURE — 25010000002 MIDAZOLAM PER 1MG: Performed by: ANESTHESIOLOGY

## 2023-11-29 PROCEDURE — 25010000002 CEFAZOLIN IN DEXTROSE 2000 MG/ 100 ML SOLUTION: Performed by: ORTHOPAEDIC SURGERY

## 2023-11-29 PROCEDURE — 25810000003 LACTATED RINGERS PER 1000 ML: Performed by: ANESTHESIOLOGY

## 2023-11-29 PROCEDURE — 25010000002 PROPOFOL 10 MG/ML EMULSION: Performed by: NURSE ANESTHETIST, CERTIFIED REGISTERED

## 2023-11-29 RX ORDER — PROMETHAZINE HYDROCHLORIDE 25 MG/1
25 SUPPOSITORY RECTAL ONCE AS NEEDED
Status: DISCONTINUED | OUTPATIENT
Start: 2023-11-29 | End: 2023-11-29 | Stop reason: HOSPADM

## 2023-11-29 RX ORDER — KETOROLAC TROMETHAMINE 30 MG/ML
INJECTION, SOLUTION INTRAMUSCULAR; INTRAVENOUS AS NEEDED
Status: DISCONTINUED | OUTPATIENT
Start: 2023-11-29 | End: 2023-11-29 | Stop reason: SURG

## 2023-11-29 RX ORDER — MIDAZOLAM HYDROCHLORIDE 2 MG/2ML
2 INJECTION, SOLUTION INTRAMUSCULAR; INTRAVENOUS ONCE
Status: COMPLETED | OUTPATIENT
Start: 2023-11-29 | End: 2023-11-29

## 2023-11-29 RX ORDER — BUPIVACAINE HYDROCHLORIDE 5 MG/ML
INJECTION, SOLUTION EPIDURAL; INTRACAUDAL AS NEEDED
Status: DISCONTINUED | OUTPATIENT
Start: 2023-11-29 | End: 2023-11-29 | Stop reason: HOSPADM

## 2023-11-29 RX ORDER — ONDANSETRON 2 MG/ML
4 INJECTION INTRAMUSCULAR; INTRAVENOUS ONCE AS NEEDED
Status: DISCONTINUED | OUTPATIENT
Start: 2023-11-29 | End: 2023-11-29 | Stop reason: HOSPADM

## 2023-11-29 RX ORDER — HYDROCODONE BITARTRATE AND ACETAMINOPHEN 7.5; 325 MG/1; MG/1
1 TABLET ORAL ONCE AS NEEDED
Status: DISCONTINUED | OUTPATIENT
Start: 2023-11-29 | End: 2023-11-29 | Stop reason: HOSPADM

## 2023-11-29 RX ORDER — HYDROCODONE BITARTRATE AND ACETAMINOPHEN 7.5; 325 MG/1; MG/1
1 TABLET ORAL EVERY 4 HOURS PRN
Qty: 30 TABLET | Refills: 0 | Status: SHIPPED | OUTPATIENT
Start: 2023-11-29 | End: 2023-12-05

## 2023-11-29 RX ORDER — PROPOFOL 10 MG/ML
VIAL (ML) INTRAVENOUS AS NEEDED
Status: DISCONTINUED | OUTPATIENT
Start: 2023-11-29 | End: 2023-11-29 | Stop reason: SURG

## 2023-11-29 RX ORDER — CEFAZOLIN SODIUM 2 G/100ML
2 INJECTION, SOLUTION INTRAVENOUS ONCE
Status: COMPLETED | OUTPATIENT
Start: 2023-11-29 | End: 2023-11-29

## 2023-11-29 RX ORDER — OXYCODONE HYDROCHLORIDE 5 MG/1
5 TABLET ORAL
Status: DISCONTINUED | OUTPATIENT
Start: 2023-11-29 | End: 2023-11-29 | Stop reason: HOSPADM

## 2023-11-29 RX ORDER — PROMETHAZINE HYDROCHLORIDE 12.5 MG/1
25 TABLET ORAL ONCE AS NEEDED
Status: DISCONTINUED | OUTPATIENT
Start: 2023-11-29 | End: 2023-11-29 | Stop reason: HOSPADM

## 2023-11-29 RX ORDER — MEPERIDINE HYDROCHLORIDE 25 MG/ML
12.5 INJECTION INTRAMUSCULAR; INTRAVENOUS; SUBCUTANEOUS
Status: DISCONTINUED | OUTPATIENT
Start: 2023-11-29 | End: 2023-11-29 | Stop reason: HOSPADM

## 2023-11-29 RX ORDER — LIDOCAINE HYDROCHLORIDE 20 MG/ML
INJECTION, SOLUTION EPIDURAL; INFILTRATION; INTRACAUDAL; PERINEURAL AS NEEDED
Status: DISCONTINUED | OUTPATIENT
Start: 2023-11-29 | End: 2023-11-29 | Stop reason: SURG

## 2023-11-29 RX ORDER — ACETAMINOPHEN 500 MG
1000 TABLET ORAL ONCE
Status: COMPLETED | OUTPATIENT
Start: 2023-11-29 | End: 2023-11-29

## 2023-11-29 RX ORDER — SODIUM CHLORIDE, SODIUM LACTATE, POTASSIUM CHLORIDE, CALCIUM CHLORIDE 600; 310; 30; 20 MG/100ML; MG/100ML; MG/100ML; MG/100ML
9 INJECTION, SOLUTION INTRAVENOUS CONTINUOUS PRN
Status: DISCONTINUED | OUTPATIENT
Start: 2023-11-29 | End: 2023-11-29 | Stop reason: HOSPADM

## 2023-11-29 RX ORDER — ONDANSETRON 2 MG/ML
INJECTION INTRAMUSCULAR; INTRAVENOUS AS NEEDED
Status: DISCONTINUED | OUTPATIENT
Start: 2023-11-29 | End: 2023-11-29 | Stop reason: SURG

## 2023-11-29 RX ORDER — CEFAZOLIN SODIUM IN 0.9 % NACL 3 G/100 ML
3 INTRAVENOUS SOLUTION, PIGGYBACK (ML) INTRAVENOUS ONCE
Status: DISCONTINUED | OUTPATIENT
Start: 2023-11-29 | End: 2023-11-29 | Stop reason: SDUPTHER

## 2023-11-29 RX ORDER — KETAMINE HCL IN NACL, ISO-OSM 100MG/10ML
SYRINGE (ML) INJECTION AS NEEDED
Status: DISCONTINUED | OUTPATIENT
Start: 2023-11-29 | End: 2023-11-29 | Stop reason: SURG

## 2023-11-29 RX ADMIN — KETOROLAC TROMETHAMINE 30 MG: 30 INJECTION, SOLUTION INTRAMUSCULAR; INTRAVENOUS at 07:30

## 2023-11-29 RX ADMIN — PROPOFOL 50 MG: 10 INJECTION, EMULSION INTRAVENOUS at 07:18

## 2023-11-29 RX ADMIN — CEFAZOLIN SODIUM 2 G: 2 INJECTION, SOLUTION INTRAVENOUS at 07:14

## 2023-11-29 RX ADMIN — MIDAZOLAM HYDROCHLORIDE 2 MG: 1 INJECTION, SOLUTION INTRAMUSCULAR; INTRAVENOUS at 07:07

## 2023-11-29 RX ADMIN — PROPOFOL 100 MCG/KG/MIN: 10 INJECTION, EMULSION INTRAVENOUS at 07:22

## 2023-11-29 RX ADMIN — ONDANSETRON 4 MG: 2 INJECTION INTRAMUSCULAR; INTRAVENOUS at 07:14

## 2023-11-29 RX ADMIN — Medication 50 MG: at 07:18

## 2023-11-29 RX ADMIN — SODIUM CHLORIDE, POTASSIUM CHLORIDE, SODIUM LACTATE AND CALCIUM CHLORIDE 9 ML/HR: 600; 310; 30; 20 INJECTION, SOLUTION INTRAVENOUS at 06:50

## 2023-11-29 RX ADMIN — ACETAMINOPHEN 1000 MG: 500 TABLET ORAL at 06:51

## 2023-11-29 RX ADMIN — LIDOCAINE HYDROCHLORIDE 100 MG: 20 INJECTION, SOLUTION EPIDURAL; INFILTRATION; INTRACAUDAL; PERINEURAL at 07:18

## 2023-11-29 NOTE — ANESTHESIA PREPROCEDURE EVALUATION
Anesthesia Evaluation     Patient summary reviewed and Nursing notes reviewed   no history of anesthetic complications:   NPO Solid Status: > 8 hours  NPO Liquid Status: > 2 hours           Airway   Mallampati: II  TM distance: >3 FB  Neck ROM: full  No difficulty expected  Dental    (+) poor dentition        Pulmonary - negative pulmonary ROS and normal exam    breath sounds clear to auscultation  Cardiovascular - normal exam  Exercise tolerance: good (4-7 METS)    Rhythm: regular  Rate: normal    (+) hypertension, CAD, cardiac stents more than 12 months ago       Neuro/Psych- negative ROS  GI/Hepatic/Renal/Endo    (+) diabetes mellitus    Musculoskeletal (-) negative ROS    Abdominal    Substance History - negative use     OB/GYN negative ob/gyn ROS         Other - negative ROS       ROS/Med Hx Other: HX OFPVD, DM, MI, CAD W/ 5 STENTS PLACED 2/2022. CARDIAC CLEARED WITH MODERATE RISK BY DR. DOMINGO.  LAST O.V. 6/2/23 DR. DOMINGO.  ECHO 6/2/23 EF 65%, BASAL INFERIOR SEPTUM HYPOKENISIS. . NO CP/SOA.METS>4. ELM               Anesthesia Plan    ASA 4     general       Anesthetic plan, risks, benefits, and alternatives have been provided, discussed and informed consent has been obtained with: patient.    CODE STATUS:

## 2023-11-29 NOTE — OP NOTE
CARPAL TUNNEL RELEASE  Procedure Report    Patient Name:  Lamin Martinez  YOB: 1950    Date of Surgery:  11/29/2023     Indications:  +CTS    Pre-op Diagnosis:   Carpal tunnel syndrome of left wrist [G56.02]       Post-Op Diagnosis Codes:     * Carpal tunnel syndrome of left wrist [G56.02]    Procedure/CPT® Codes:      Procedure(s):  RIGHT CARPAL TUNNEL RELEASE    Staff:  Surgeon(s):  Barbara Rodriguez MD    Assistant: Blaise Figueredo    Anesthesia: General    Estimated Blood Loss: 1 mL    Implants:    Nothing was implanted during the procedure    Specimen:          None        Findings: Carpal Tunnel Syndrome     Complications: None    Description of Procedure: The patient was brought to the operating room and underwent  anesthesia without complication.  The patient received preoperative antibiotic and was then prepped and draped in sterile fashion.  Incision was made directly over the transverse carpal ligament and dissected down.  The ligament was identified and then opened using the #15 blade and then completed using tenotomy scissors.  Care was taken to avoid any neurovascular or tendinous structures and then checked with a [place] and a good release was achieved.  This was then thoroughly irrigated and closed using 4-0 nylon.  Half percent plain Marcaine was injected around the surgical site.  A sterile dressing was applied, followed by application of a splint.  The tourniquet was then deflated.  The patient was taken to the recovery room in stable condition.  There were no complications.    Assistant: Blaise Figueredo  was responsible for performing the following activities: Retraction, Suction, Irrigation, Closing, and Placing Dressing and their skilled assistance was necessary for the success of this case.    Barbara Rodriguez MD     Date: 11/29/2023  Time: 07:44 EST

## 2023-11-29 NOTE — DISCHARGE INSTRUCTIONS
DISCHARGE INSTRUCTIONS  CARPAL TUNNEL RELEASE      For your surgery you had:      Local anesthesia  Monitored anesthesia care      You may experience dizziness, drowsiness, or lightheadedness for several hours following surgery.  Do not stay alone today or tonight.  Limit your activity for 24 hours.  Resume your diet slowly.    You should not drive or operate machinery, drink alcohol, or sign legally binding documents for 24 hours or while you are taking pain medication.  If you had an axillary or regional block, you may not have control of the involved limb for up to 12 hours. Protect the arm with a sling or follow your physician's specific instructions. Carry the upper arm in a sling so that the hand and wrist are above the level of the heart. Elevate affected arm on a pillow when resting.   Use ice to affected area for 48-72 hours. Apply 20 minutes on - 20 minutes off. Do NOT apply directly to skin.  Exercise fingers frequently by making a full fist and fully straightening the fingers. This will help prevent swelling and stiffness.  Do NOT do any heavy lifting, pulling or strenuous activities using the affected hand. [x] Keep splint clean and dry.  .  [] Do NOT submerge in water.  [] Keep incision area clean and dry.  [] You may shower or bathe but keep splint dry     .  NOTIFY YOUR DOCTOR IF YOU EXPERIENCE ANY OF THE FOLLOWING:  Temperature greater than 101 degrees Fahrenheit  Shaking Chills  Redness or excessive drainage from incision  Nausea, vomiting and/or pain that is not controlled by prescribed medications  Increase in bleeding or bleeding that is excessive  Unable to urinate in 6 hours after surgery  If unable to reach your doctor, please go to the closest Emergency Room  Last dose of pain medication was given at:   .  You should see   for follow-up care   on   .  Phone number:      SPECIAL INSTRUCTIONS:               I have read and received the above instructions.

## 2023-11-29 NOTE — ANESTHESIA POSTPROCEDURE EVALUATION
Patient: Lamin Martinez    Procedure Summary       Date: 11/29/23 Room / Location: [facility] OR  / [facility]    Anesthesia Start: 0714 Anesthesia Stop: 0745    Procedure: RIGHT CARPAL TUNNEL RELEASE (Right: Wrist) Diagnosis:       Carpal tunnel syndrome of left wrist      (Carpal tunnel syndrome of left wrist [G56.02])    Surgeons: Barbara Rodriguez MD Provider: Mitchell Cano CRNA    Anesthesia Type: general ASA Status: 4            Anesthesia Type: general    Vitals  Vitals Value Taken Time   /77 11/29/23 0828   Temp 36.2 °C (97.1 °F) 11/29/23 0748   Pulse 55 11/29/23 0829   Resp 16 11/29/23 0748   SpO2 93 % 11/29/23 0829   Vitals shown include unfiled device data.        Post Anesthesia Care and Evaluation    Patient location during evaluation: bedside  Patient participation: complete - patient participated  Level of consciousness: awake  Pain management: adequate    Airway patency: patent  PONV Status: none  Cardiovascular status: acceptable and stable  Respiratory status: acceptable  Hydration status: acceptable    Comments: An Anesthesiologist personally participated in the most demanding procedures (including induction and emergence if applicable) in the anesthesia plan, monitored the course of anesthesia administration at frequent intervals and remained physically present and available for immediate diagnosis and treatment of emergencies.

## 2023-12-05 ENCOUNTER — OFFICE VISIT (OUTPATIENT)
Dept: FAMILY MEDICINE CLINIC | Age: 73
End: 2023-12-05
Payer: MEDICARE

## 2023-12-05 VITALS
HEIGHT: 68 IN | SYSTOLIC BLOOD PRESSURE: 101 MMHG | OXYGEN SATURATION: 97 % | TEMPERATURE: 98.1 F | WEIGHT: 213.4 LBS | DIASTOLIC BLOOD PRESSURE: 68 MMHG | HEART RATE: 60 BPM | BODY MASS INDEX: 32.34 KG/M2

## 2023-12-05 DIAGNOSIS — E55.9 VITAMIN D DEFICIENCY: ICD-10-CM

## 2023-12-05 DIAGNOSIS — E78.2 MIXED HYPERLIPIDEMIA: ICD-10-CM

## 2023-12-05 DIAGNOSIS — Z11.59 ENCOUNTER FOR SCREENING FOR OTHER VIRAL DISEASES: Primary | ICD-10-CM

## 2023-12-05 DIAGNOSIS — Z98.890 S/P CARPAL TUNNEL RELEASE: ICD-10-CM

## 2023-12-05 NOTE — PROGRESS NOTES
The ABCs of the Annual Wellness Visit  Subsequent Medicare Wellness Visit    Subjective    Lamin Martinez is a 73 y.o. male who presents for a Subsequent Medicare Wellness Visit.    The following portions of the patient's history were reviewed and   updated as appropriate: allergies, current medications, past family history, past medical history, past social history, past surgical history, and problem list.    Compared to one year ago, the patient feels his physical   health is the same.    Compared to one year ago, the patient feels his mental   health is the same.    Recent Hospitalizations:  He was not admitted to the hospital during the last year.       Current Medical Providers:  Patient Care Team:  Therese Carreno APRN as PCP - General (Nurse Practitioner)  Michelle Walters APRN as Nurse Practitioner (Nurse Practitioner)    Outpatient Medications Prior to Visit   Medication Sig Dispense Refill    Ascorbic Acid 500 MG chewable tablet Chew 500 mg Daily.      aspirin 81 MG chewable tablet Chew 1 tablet Daily. Last dose 11-22-23      atorvastatin (LIPITOR) 40 MG tablet Take 1 tablet by mouth Every Night. 90 tablet 1    Blood Glucose Monitoring Suppl device Use as directed for blood glucose monitoring 1 each 0    carvedilol (COREG) 25 MG tablet Take 1 tablet by mouth 2 (Two) Times a Day With Meals.      clopidogrel (PLAVIX) 75 MG tablet Take 1 tablet by mouth Daily. 90 tablet 1    dapagliflozin (Farxiga) 5 MG tablet tablet Take 1 tablet by mouth Daily. 90 tablet 1    glipizide (GLUCOTROL XL) 10 MG 24 hr tablet Take 1 tablet by mouth 2 (Two) Times a Day for 180 days. 180 tablet 1    glucose blood test strip Test blood glucose 1 time each day 100 each 5    isosorbide mononitrate (IMDUR) 30 MG 24 hr tablet Take 1 tablet by mouth Daily. 90 tablet 1    Lancets misc Test blood glucose 1 time each day 100 each 5    lisinopril (PRINIVIL,ZESTRIL) 10 MG tablet Take 1 tablet by mouth Daily. 90 tablet 1     "multivitamin (THERAGRAN) tablet tablet Take 1 tablet by mouth Daily.      pioglitazone (ACTOS) 15 MG tablet Take 1 tablet by mouth Daily for 180 days. (Patient taking differently: Take 1 tablet by mouth 2 (Two) Times a Day.) 90 tablet 1    tamsulosin (FLOMAX) 0.4 MG capsule 24 hr capsule Take 1 capsule by mouth 2 (Two) Times a Day. 90 capsule 1    vitamin D3 125 MCG (5000 UT) capsule capsule Take 1 capsule by mouth Daily. 90 capsule 1    HYDROcodone-acetaminophen (NORCO) 7.5-325 MG per tablet Take 1 tablet by mouth Every 4 (Four) Hours As Needed for Moderate Pain (Pain). (Patient not taking: Reported on 12/5/2023) 30 tablet 0     No facility-administered medications prior to visit.       No opioid medication identified on active medication list. I have reviewed chart for other potential  high risk medication/s and harmful drug interactions in the elderly.        Aspirin is on active medication list. Aspirin use is indicated based on review of current medical condition/s. Pros and cons of this therapy have been discussed today. Benefits of this medication outweigh potential harm.  Patient has been encouraged to continue taking this medication.  .      Patient Active Problem List   Diagnosis    Type 2 diabetes mellitus without complication, without long-term current use of insulin    Mixed hyperlipidemia    Primary hypertension    Benign prostatic hyperplasia with lower urinary tract symptoms    Vitamin D deficiency    Carpal tunnel syndrome of left wrist     Advance Care Planning   Advance Care Planning     Advance Directive is on file.  ACP discussion was held with the patient during this visit. Patient has an advance directive in EMR which is still valid.      Objective    Vitals:    12/05/23 1059   BP: 101/68   BP Location: Left arm   Patient Position: Sitting   Cuff Size: Large Adult   Pulse: 60   Temp: 98.1 °F (36.7 °C)   TempSrc: Oral   SpO2: 97%   Weight: 96.8 kg (213 lb 6.4 oz)   Height: 171.5 cm (67.52\") " "    Estimated body mass index is 32.91 kg/m² as calculated from the following:    Height as of this encounter: 171.5 cm (67.52\").    Weight as of this encounter: 96.8 kg (213 lb 6.4 oz).           Does the patient have evidence of cognitive impairment? No          HEALTH RISK ASSESSMENT    Smoking Status:  Social History     Tobacco Use   Smoking Status Former    Packs/day: 2.00    Years: 3.00    Additional pack years: 0.00    Total pack years: 6.00    Types: Cigarettes    Start date: 2019    Quit date: 2022    Years since quittin.9   Smokeless Tobacco Never     Alcohol Consumption:  Social History     Substance and Sexual Activity   Alcohol Use Yes    Comment: occasionally     Fall Risk Screen:    VIVIEN Fall Risk Assessment was completed, and patient is at LOW risk for falls.Assessment completed on:2023    Depression Screenin/5/2023    10:57 AM   PHQ-2/PHQ-9 Depression Screening   Little Interest or Pleasure in Doing Things 0-->not at all   Feeling Down, Depressed or Hopeless 0-->not at all   PHQ-9: Brief Depression Severity Measure Score 0       Health Habits and Functional and Cognitive Screenin/5/2023    10:57 AM   Functional & Cognitive Status   Do you have difficulty preparing food and eating? No   Do you have difficulty bathing yourself, getting dressed or grooming yourself? No   Do you have difficulty using the toilet? No   Do you have difficulty moving around from place to place? No   Do you have trouble with steps or getting out of a bed or a chair? No   Current Diet Well Balanced Diet   Dental Exam Not up to date   Eye Exam Up to date   Exercise (times per week) 4 times per week   Current Exercises Include Walking   Do you need help using the phone?  No   Are you deaf or do you have serious difficulty hearing?  No   Do you need help to go to places out of walking distance? No   Do you need help shopping? No   Do you need help preparing meals?  No   Do you need help " with housework?  No   Do you need help with laundry? No   Do you need help taking your medications? No   Do you need help managing money? No   Do you ever drive or ride in a car without wearing a seat belt? No   Have you felt unusual stress, anger or loneliness in the last month? No   Who do you live with? Alone   If you need help, do you have trouble finding someone available to you? No   Have you been bothered in the last four weeks by sexual problems? No   Do you have difficulty concentrating, remembering or making decisions? No       Age-appropriate Screening Schedule:  Refer to the list below for future screening recommendations based on patient's age, sex and/or medical conditions. Orders for these recommended tests are listed in the plan section. The patient has been provided with a written plan.    Health Maintenance   Topic Date Due    HEPATITIS C SCREENING  Never done    DIABETIC FOOT EXAM  Never done    AAA SCREEN (ONE-TIME)  12/05/2023 (Originally 6/16/2023)    ZOSTER VACCINE (1 of 2) 12/05/2023 (Originally 8/13/1999)    COVID-19 Vaccine (4 - 2023-24 season) 12/07/2023 (Originally 9/1/2023)    DIABETIC EYE EXAM  01/23/2024 (Originally 5/4/2022)    INFLUENZA VACCINE  03/31/2024 (Originally 8/1/2023)    Pneumococcal Vaccine 65+ (1 - PCV) 08/01/2024 (Originally 8/13/1956)    TDAP/TD VACCINES (1 - Tdap) 12/05/2024 (Originally [birth year]/1968)    COLORECTAL CANCER SCREENING  10/05/2026 (Originally 1950)    HEMOGLOBIN A1C  02/11/2024    BMI FOLLOWUP  05/25/2024    LIPID PANEL  06/20/2024    URINE MICROALBUMIN  06/20/2024    ANNUAL WELLNESS VISIT  12/05/2024                  CMS Preventative Services Quick Reference  Risk Factors Identified During Encounter  Immunizations Discussed/Encouraged: Tdap, Prevnar 20 (Pneumococcal 20-valent conjugate), Shingrix, COVID19, and RSV (Respiratory Syncytial Virus)  Inactivity/Sedentary: Patient was advised to exercise at least 150 minutes a week per CDC  "recommendations.  Polypharmacy: Medication List reviewed and Medications are appropriate for patient  Dental Screening Recommended  Vision Screening Recommended  The above risks/problems have been discussed with the patient.  Pertinent information has been shared with the patient in the After Visit Summary.  An After Visit Summary and PPPS were made available to the patient.    Follow Up:   Next Medicare Wellness visit to be scheduled in 1 year.       Additional E&M Note during same encounter follows:  Patient has multiple medical problems which are significant and separately identifiable that require additional work above and beyond the Medicare Wellness Visit.      Chief Complaint  Medicare Wellness-subsequent    Subjective        HPI  Lamin Martinez is also being seen today for wet bandages to right arm. He is 6 days s/p carpal tunnel release and he tried to take a shower. States that hand already feels better and has a f/u with ortho on the 14th.      Objective   Vital Signs:  /68 (BP Location: Left arm, Patient Position: Sitting, Cuff Size: Large Adult)   Pulse 60   Temp 98.1 °F (36.7 °C) (Oral)   Ht 171.5 cm (67.52\")   Wt 96.8 kg (213 lb 6.4 oz)   SpO2 97%   BMI 32.91 kg/m²     Physical Exam  Vitals reviewed.   Constitutional:       General: He is not in acute distress.     Appearance: He is well-developed.   HENT:      Head: Normocephalic and atraumatic.   Eyes:      Conjunctiva/sclera: Conjunctivae normal.      Pupils: Pupils are equal, round, and reactive to light.   Cardiovascular:      Rate and Rhythm: Normal rate and regular rhythm.      Heart sounds: Normal heart sounds. No murmur heard.  Pulmonary:      Effort: Pulmonary effort is normal. No respiratory distress.      Breath sounds: Normal breath sounds.   Skin:     General: Skin is warm and dry.   Neurological:      Mental Status: He is alert and oriented to person, place, and time.   Psychiatric:         Mood and Affect: Mood and " affect normal.         Behavior: Behavior normal.         Thought Content: Thought content normal.         Judgment: Judgment normal.                         Assessment and Plan   Diagnoses and all orders for this visit:    1. Encounter for screening for other viral diseases (Primary)  Comments:  Will notify patient of results and treat accordingly.  Orders:  -     Hepatitis C Antibody; Future    2. Mixed hyperlipidemia  Comments:  Patient to have cholesterol panel drawn when he has labs drawn for nephrology.  Orders:  -     Lipid Panel; Future  -     Comprehensive Metabolic Panel; Future  -     Lipid Panel; Future    3. Vitamin D deficiency  Comments:  lab placed for next visit. continue current 5000 units daily.  Orders:  -     Vitamin D,25-Hydroxy; Future    4. S/P carpal tunnel release  Comments:  bandages were saturated with water. New guaze, kerlix and ace wrap used. Used existing orthoglass splint. Cap refill wnl. Pt instructed to loosen if too tight.    No signs of infection and ortho notified.   Patient to notify office with any acute concerns or issues.  Patient verbalizes understanding, agrees with plan of care and has no further questions upon discharge.    Please note that portions of this note were completed with a voice recognition program.         Follow Up   Return in about 4 months (around 4/5/2024) for Recheck.  Patient was given instructions and counseling regarding his condition or for health maintenance advice. Please see specific information pulled into the AVS if appropriate.

## 2023-12-08 ENCOUNTER — LAB (OUTPATIENT)
Dept: LAB | Facility: HOSPITAL | Age: 73
End: 2023-12-08
Payer: MEDICARE

## 2023-12-08 ENCOUNTER — TRANSCRIBE ORDERS (OUTPATIENT)
Dept: LAB | Facility: HOSPITAL | Age: 73
End: 2023-12-08
Payer: MEDICARE

## 2023-12-08 DIAGNOSIS — E13.9 MATURITY ONSET DIABETES MELLITUS IN YOUNG: ICD-10-CM

## 2023-12-08 DIAGNOSIS — N18.4 CHRONIC KIDNEY DISEASE, STAGE IV (SEVERE): Primary | ICD-10-CM

## 2023-12-08 DIAGNOSIS — E78.2 MIXED HYPERLIPIDEMIA: ICD-10-CM

## 2023-12-08 DIAGNOSIS — E55.9 VITAMIN D DEFICIENCY: ICD-10-CM

## 2023-12-08 DIAGNOSIS — N18.4 CHRONIC KIDNEY DISEASE, STAGE IV (SEVERE): ICD-10-CM

## 2023-12-08 DIAGNOSIS — Z11.59 ENCOUNTER FOR SCREENING FOR OTHER VIRAL DISEASES: ICD-10-CM

## 2023-12-08 LAB
25(OH)D3 SERPL-MCNC: 59 NG/ML (ref 30–100)
ALBUMIN SERPL-MCNC: 4.7 G/DL (ref 3.5–5.2)
ALBUMIN/GLOB SERPL: 1.8 G/DL
ALP SERPL-CCNC: 71 U/L (ref 39–117)
ALT SERPL W P-5'-P-CCNC: 18 U/L (ref 1–41)
ANION GAP SERPL CALCULATED.3IONS-SCNC: 13.5 MMOL/L (ref 5–15)
AST SERPL-CCNC: 19 U/L (ref 1–40)
BACTERIA UR QL AUTO: ABNORMAL /HPF
BILIRUB SERPL-MCNC: 0.4 MG/DL (ref 0–1.2)
BILIRUB UR QL STRIP: NEGATIVE
BUN SERPL-MCNC: 27 MG/DL (ref 8–23)
BUN/CREAT SERPL: 15.1 (ref 7–25)
CALCIUM SPEC-SCNC: 9.5 MG/DL (ref 8.6–10.5)
CHLORIDE SERPL-SCNC: 104 MMOL/L (ref 98–107)
CHOLEST SERPL-MCNC: 124 MG/DL (ref 0–200)
CLARITY UR: CLEAR
CO2 SERPL-SCNC: 24.5 MMOL/L (ref 22–29)
COLOR UR: YELLOW
CREAT SERPL-MCNC: 1.79 MG/DL (ref 0.76–1.27)
CREAT UR-MCNC: 86.4 MG/DL
DEPRECATED RDW RBC AUTO: 54.4 FL (ref 37–54)
EGFRCR SERPLBLD CKD-EPI 2021: 39.3 ML/MIN/1.73
ERYTHROCYTE [DISTWIDTH] IN BLOOD BY AUTOMATED COUNT: 14.6 % (ref 12.3–15.4)
GLOBULIN UR ELPH-MCNC: 2.6 GM/DL
GLUCOSE SERPL-MCNC: 165 MG/DL (ref 65–99)
GLUCOSE UR STRIP-MCNC: ABNORMAL MG/DL
HCT VFR BLD AUTO: 47.2 % (ref 37.5–51)
HCV AB SER DONR QL: NORMAL
HDLC SERPL-MCNC: 35 MG/DL (ref 40–60)
HGB BLD-MCNC: 15.1 G/DL (ref 13–17.7)
HGB UR QL STRIP.AUTO: NEGATIVE
KETONES UR QL STRIP: NEGATIVE
LDLC SERPL CALC-MCNC: 61 MG/DL (ref 0–100)
LDLC/HDLC SERPL: 1.59 {RATIO}
LEUKOCYTE ESTERASE UR QL STRIP.AUTO: ABNORMAL
MCH RBC QN AUTO: 31.4 PG (ref 26.6–33)
MCHC RBC AUTO-ENTMCNC: 32 G/DL (ref 31.5–35.7)
MCV RBC AUTO: 98.1 FL (ref 79–97)
NITRITE UR QL STRIP: NEGATIVE
PH UR STRIP.AUTO: 7 [PH] (ref 5–8)
PLATELET # BLD AUTO: 154 10*3/MM3 (ref 140–450)
PMV BLD AUTO: 10.8 FL (ref 6–12)
POTASSIUM SERPL-SCNC: 5 MMOL/L (ref 3.5–5.2)
PROT ?TM UR-MCNC: 13.3 MG/DL
PROT SERPL-MCNC: 7.3 G/DL (ref 6–8.5)
PROT UR QL STRIP: NEGATIVE
PROT/CREAT UR: 0.15 MG/G{CREAT}
RBC # BLD AUTO: 4.81 10*6/MM3 (ref 4.14–5.8)
RBC # UR STRIP: ABNORMAL /HPF
REF LAB TEST METHOD: ABNORMAL
SODIUM SERPL-SCNC: 142 MMOL/L (ref 136–145)
SP GR UR STRIP: 1.02 (ref 1–1.03)
SQUAMOUS #/AREA URNS HPF: ABNORMAL /HPF
TRIGL SERPL-MCNC: 167 MG/DL (ref 0–150)
UROBILINOGEN UR QL STRIP: ABNORMAL
VLDLC SERPL-MCNC: 28 MG/DL (ref 5–40)
WBC # UR STRIP: ABNORMAL /HPF
WBC NRBC COR # BLD AUTO: 6.33 10*3/MM3 (ref 3.4–10.8)

## 2023-12-08 PROCEDURE — 82570 ASSAY OF URINE CREATININE: CPT

## 2023-12-08 PROCEDURE — 86803 HEPATITIS C AB TEST: CPT

## 2023-12-08 PROCEDURE — 85027 COMPLETE CBC AUTOMATED: CPT

## 2023-12-08 PROCEDURE — 36415 COLL VENOUS BLD VENIPUNCTURE: CPT

## 2023-12-08 PROCEDURE — 80053 COMPREHEN METABOLIC PANEL: CPT

## 2023-12-08 PROCEDURE — 84156 ASSAY OF PROTEIN URINE: CPT

## 2023-12-08 PROCEDURE — 80061 LIPID PANEL: CPT

## 2023-12-08 PROCEDURE — 81001 URINALYSIS AUTO W/SCOPE: CPT

## 2023-12-08 PROCEDURE — 82306 VITAMIN D 25 HYDROXY: CPT

## 2023-12-14 ENCOUNTER — OFFICE VISIT (OUTPATIENT)
Dept: ORTHOPEDIC SURGERY | Facility: CLINIC | Age: 73
End: 2023-12-14
Payer: MEDICARE

## 2023-12-14 VITALS — BODY MASS INDEX: 33.43 KG/M2 | HEIGHT: 67 IN | WEIGHT: 213 LBS

## 2023-12-14 DIAGNOSIS — Z47.89 AFTERCARE FOLLOWING SURGERY OF THE MUSCULOSKELETAL SYSTEM: Primary | ICD-10-CM

## 2023-12-14 NOTE — PROGRESS NOTES
"Chief Complaint  Follow-up and Pain of the Right Wrist    Subjective      Lamin Martinez presents to [facility] for follow-up of right carpal tunnel release performed on 11/29/2023 by Dr. Davis.  Patient presents today with postoperative dressing and splint in place, reporting he has tolerated this well.  Does report that his wrist is \"a little sore\".  He does report near complete resolution of his preoperative carpal tunnel syndrome symptoms with the exception of some mild residual numbness to the distal aspect of his right thumb.    Objective   No Known Allergies    Vital Signs:   Ht 170.2 cm (67\")   Wt 96.6 kg (213 lb)   BMI 33.36 kg/m²       Physical Exam    Constitutional: Awake, alert. Well nourished appearance.    Integumentary: Warm, dry, intact. No obvious rashes.    HENT: Atraumatic, normocephalic.   Respiratory: Non labored respirations .   Cardiovascular: Intact peripheral pulses.    Psychiatric: Normal mood and affect. A&O X3    Ortho Exam  Right wrist: Skin is warm, dry, and intact.  Well-healing surgical incision at the palmar aspect of the patient's right wrist.  He is able to form a loose fist.  Limited thumb opposition.  Sensation and distal neurovascular intact.  Mildly decreased right wrist ROM in all planes.    Imaging Results (Most Recent)       None                      Assessment and Plan   Problem List Items Addressed This Visit    None  Visit Diagnoses       R CTR    -  Primary            Follow Up   Return in about 4 weeks (around 1/11/2024).    Tobacco Use: Medium Risk (12/14/2023)    Patient History     Smoking Tobacco Use: Former     Smokeless Tobacco Use: Never     Passive Exposure: Not on file     Patient is a former smoker.  Encouraged continued tobacco cessation.  Did not discuss options for smoking cessation.    Patient Instructions   Sutures removed in office.  Patient educated on incision care.  Please keep incision clean and dry.  Do not soak " or submerge in water until incision is fully healed.  Do not apply creams or lotions over the incision. Continue icing and elevation as needed to help with pain and swelling.  Ice up to 3 or 4 times daily for no longer than 15 to 20 minutes at a time.    Patient advised on return to carpal tunnel brace(s) nightly and with activity. Carpal tunnel brace provided in office today. Avoid repetitive ROM of the hand or wrist.  No heavy lifting, pushing, or pulling until incision is fully healed.  Home exercises provided today.    Follow-up in 4 weeks. No imaging.  Please call with questions or concerns.    Patient was given instructions and counseling regarding his condition or for health maintenance advice. Please see specific information pulled into the AVS if appropriate.

## 2023-12-16 DIAGNOSIS — E11.65 UNCONTROLLED TYPE 2 DIABETES MELLITUS WITH HYPERGLYCEMIA: ICD-10-CM

## 2023-12-19 NOTE — TELEPHONE ENCOUNTER
Left voice message to have patient call office back if he is taking his medication 1 time of 2 times daily.

## 2023-12-20 RX ORDER — PIOGLITAZONEHYDROCHLORIDE 15 MG/1
15 TABLET ORAL 2 TIMES DAILY
Qty: 180 TABLET | Refills: 1 | Status: SHIPPED | OUTPATIENT
Start: 2023-12-20

## 2023-12-24 DIAGNOSIS — I25.119 CORONARY ARTERY DISEASE INVOLVING NATIVE HEART WITH ANGINA PECTORIS, UNSPECIFIED VESSEL OR LESION TYPE: ICD-10-CM

## 2023-12-26 ENCOUNTER — TELEPHONE (OUTPATIENT)
Dept: DIABETES SERVICES | Facility: HOSPITAL | Age: 73
End: 2023-12-26
Payer: MEDICARE

## 2023-12-26 RX ORDER — CLOPIDOGREL BISULFATE 75 MG/1
75 TABLET ORAL DAILY
Qty: 90 TABLET | Refills: 1 | Status: SHIPPED | OUTPATIENT
Start: 2023-12-26

## 2023-12-26 NOTE — TELEPHONE ENCOUNTER
PT LAST SEEN IN OFFICE 8-11-23 NO FOLLOW UP APPT MADE, PA REQUEST FOR PIOGLITAZONE RECEIVED AND NOT ABLE TO COMPLETE. PT MUST MAKE AN APPT.      12-26-23

## 2023-12-29 ENCOUNTER — TELEPHONE (OUTPATIENT)
Dept: DIABETES SERVICES | Facility: HOSPITAL | Age: 73
End: 2023-12-29
Payer: MEDICARE

## 2023-12-29 NOTE — TELEPHONE ENCOUNTER
Lamin Martinez Key: [id number] - PA Case ID: [id number] - Rx #: [id number]  Pioglitazone HCl 15MG tablets  Approvedtoday  PA Case: [id number], Status: Approved, Coverage Starts on: 1/1/2023 12:00:00 AM, Coverage Ends on: 12/31/2024 12:00:00 AM. Questions? Contact [phone].

## 2023-12-29 NOTE — TELEPHONE ENCOUNTER
Patient notified.     r/o CHF exacerbation, ACS, electrolyte abnormality. ?gastritis. less likely pancreatitis/GB disease. Plan for EKG, labs, CXR, GI cocktail, d/w pt's cardiologist.

## 2023-12-29 NOTE — TELEPHONE ENCOUNTER
Patient called into office sttating that this medication would need a PA, let patient know we would need to see him in office before a pa could be done, patient stated he is going on vacation for a week but when he returns he can come to the office. Got patient scheduled on 1-, okay for pa to be completed?

## 2024-01-11 NOTE — PROGRESS NOTES
Chief Complaint  Diabetes (Follow up, no devices )    Referred By: EFRAIN Vizcaino    Subjective          Lamin Martinez presents to [facility] for diabetes medication management    History of Present Illness    Visit type:  follow-up  Diabetes type:  Type 2  Current diabetes status/concerns/issues:  reports he was on vacation in [place] [facility] and admits he did not eat very well while he was gone. States he did not check his glucose while on vacation.States he is getting all his food from [facility] [employer] but most of the food is high carb. States he hs not had the money to get his test strips. Admits he is on a limited budget with only getting Social Security.   Other health concerns: states he had right carpal tunnel in November and he does not feel it was effective. State she had jonathan cataract removal in Oct and he does not feel like it was effective as well. 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 Reports occ chest pain. States the pain occurs if he walks for a long periods. States he has to stop and take rest breaks and it resolves. Denies pain radiating down the arm, in the back, jaw, N/V, or diaphoresis. States he has 5 stents in his heart. Admits he saw cardiology 2 months ago and they did an echo and changed his meds and it improved for awhile. He is going to call cardiology for an appt. Denies any chest pain today. Reports his last chest pain was 10 days ago.   Current Diabetes symptoms:    Polyuria: No   Polydipsia: No   Polyphagia: No   Blurred vision: No   Excessive fatigue: No  Known Diabetes complications:  Neuropathy: None; Location: N/A  Renal: Stage IIIb moderate (GFR = 30-44 mL/min. He is followed by Selma ZUNIGA at [facility]  Eyes: None; Location: N/A  Amputation/Wounds: None  GI: None  Cardiovascular: Hypertension, Hyperlipidemia, CAD and  Other: History of 5 cardiac stents  ED: Patient Reported  Other: None  Hypoglycemia:  None reported at this time  Hypoglycemia Symptoms:  No hypoglycemia at this time  Current diabetes treatment:  Actos 15mg bid, Glipizide 10mg bid, farxiga 5mg qd   Blood glucose device:  Not currently monitoring BG  Blood glucose monitoring frequency:  Not currently monitoring BG  Blood glucose range/average:  Not currently monitoring BG  Glucose Source: Patient Reported  Diet:  Limits high carb/sweet foods, Avoids sugary drinks, History of Diabetes Nutrition Counseling - YES. States he is eating a lot of fruits and salads and avoiding potatoes, pasta, bread or rice  Activity/Exercise:   he walks several days wk short distances     Past Medical History:   Diagnosis Date    CAD (coronary artery disease)     5 stents place, follows with Dr. Bloom    Diabetes     Ave AM -120    Enlarged prostate     History of cardiac cath     Hypertension     PVD (peripheral vascular disease)     follows with Dr. Bloom     Past Surgical History:   Procedure Laterality Date    CARPAL TUNNEL RELEASE Right 2023    Procedure: RIGHT CARPAL TUNNEL RELEASE;  Surgeon: Barbara Rodriguez MD;  Location: [facility] [place];  Service: Orthopedics;  Laterality: Right;    CATARACT EXTRACTION      CORONARY ANGIOPLASTY WITH STENT PLACEMENT      5 stents placed     TONSILLECTOMY       Family History   Problem Relation Age of Onset    Diabetes Sister     Diabetes Brother      Social History     Socioeconomic History    Marital status: Single   Tobacco Use    Smoking status: Former     Packs/day: 2.00     Years: 3.00     Additional pack years: 0.00     Total pack years: 6.00     Types: Cigarettes     Start date: 2019     Quit date: 2022     Years since quittin.0    Smokeless tobacco: Never   Vaping Use    Vaping Use: Never used   Substance and Sexual Activity    Alcohol use: Yes     Comment: occasionally    Drug use: Never     No Known  "Allergies    Current Outpatient Medications:     Ascorbic Acid 500 MG chewable tablet, Chew 500 mg Daily., Disp: , Rfl:     aspirin 81 MG chewable tablet, Chew 1 tablet Daily. Last dose 11-22-23, Disp: , Rfl:     atorvastatin (LIPITOR) 40 MG tablet, Take 1 tablet by mouth Every Night., Disp: 90 tablet, Rfl: 1    Blood Glucose Monitoring Suppl device, Use as directed for blood glucose monitoring, Disp: 1 each, Rfl: 0    carvedilol (COREG) 25 MG tablet, Take 1 tablet by mouth 2 (Two) Times a Day With Meals., Disp: , Rfl:     clopidogrel (PLAVIX) 75 MG tablet, Take 1 tablet by mouth once daily, Disp: 90 tablet, Rfl: 1    dapagliflozin (Farxiga) 5 MG tablet tablet, Take 1 tablet by mouth Daily., Disp: 90 tablet, Rfl: 1    glipizide (GLUCOTROL XL) 10 MG 24 hr tablet, Take 1 tablet by mouth 2 (Two) Times a Day for 180 days., Disp: 180 tablet, Rfl: 1    glucose blood test strip, Test blood glucose 1 time each day, Disp: 100 each, Rfl: 5    isosorbide mononitrate (IMDUR) 30 MG 24 hr tablet, Take 1 tablet by mouth Daily., Disp: 90 tablet, Rfl: 1    Lancets misc, Test blood glucose 1 time each day, Disp: 100 each, Rfl: 5    lisinopril (PRINIVIL,ZESTRIL) 10 MG tablet, Take 1 tablet by mouth Daily., Disp: 90 tablet, Rfl: 1    multivitamin (THERAGRAN) tablet tablet, Take 1 tablet by mouth Daily., Disp: , Rfl:     pioglitazone (ACTOS) 15 MG tablet, Take 1 tablet by mouth twice daily, Disp: 180 tablet, Rfl: 1    tamsulosin (FLOMAX) 0.4 MG capsule 24 hr capsule, Take 1 capsule by mouth 2 (Two) Times a Day., Disp: 90 capsule, Rfl: 1    vitamin D3 125 MCG (5000 UT) capsule capsule, Take 1 capsule by mouth Daily., Disp: 90 capsule, Rfl: 1    Objective     Vitals:    01/12/24 1525   BP: 124/68   BP Location: Left arm   Patient Position: Sitting   Cuff Size: Adult   Pulse: 63   SpO2: 97%   Weight: 97.8 kg (215 lb 9.6 oz)   Height: 170.2 cm (67\")     Body mass index is 33.77 kg/m².    Physical Exam  Constitutional:       Appearance: " Normal appearance. He is obese.      Comments: Obesity (BMI 30 - 39.9) Pt Current BMI = 33.77     HENT:      Head: Normocephalic and atraumatic.      Right Ear: External ear normal.      Left Ear: External ear normal.      Nose: Nose normal.   Eyes:      Extraocular Movements: Extraocular movements intact.      Conjunctiva/sclera: Conjunctivae normal.   Pulmonary:      Effort: Pulmonary effort is normal.   Musculoskeletal:         General: Normal range of motion.      Cervical back: Normal range of motion.   Skin:     General: Skin is warm and dry.   Neurological:      General: No focal deficit present.      Mental Status: He is alert and oriented to person, place, and time. Mental status is at baseline.   Psychiatric:         Mood and Affect: Mood normal.         Behavior: Behavior normal.         Thought Content: Thought content normal.         Judgment: Judgment normal.             Result Review :   The following data was reviewed by: EFRAIN Quesada on 01/12/2024:    Most Recent A1C          1/12/2024    15:35   HGBA1C Most Recent   Hemoglobin A1C 6.8        A1C Last 3 Results          7/14/2023    13:36 8/11/2023    15:51 1/12/2024    15:35   HGBA1C Last 3 Results   Hemoglobin A1C 8.1  7.4  6.8      A1c collected in the office today is 6.8%, indicating Controlled Type II diabetes.  This result is down from the prior result of 7.4% collected on 8/11/2023    Glucose   Date Value Ref Range Status   01/12/2024 185 (H) 70 - 99 mg/dL Final     Comment:     Serial Number: [id number]:  [id number]     Point of care glucose in the office today is  elevated at 185 mg/dL.    Creatinine   Date Value Ref Range Status   12/08/2023 1.79 (H) 0.76 - 1.27 mg/dL Final   06/20/2023 1.93 (H) 0.76 - 1.27 mg/dL Final     eGFR   Date Value Ref Range Status   12/08/2023 39.3 (L) >60.0 mL/min/1.73 Final   06/20/2023 36.1 (L) >60.0 mL/min/1.73 Final     Labs collected on 12/8/2023 show Stage IIIb moderate (GFR = 30-44  mL/min    Microalbumin, Urine   Date Value Ref Range Status   06/20/2023 3.5 mg/dL Final     Creatinine, Urine   Date Value Ref Range Status   12/08/2023 86.4 mg/dL Final   06/20/2023 107.9 mg/dL Final     Microalbumin/Creatinine Ratio   Date Value Ref Range Status   06/20/2023 32.4 mg/g Final     Urine microalbuminuria collected on 6/20/2023 is positive for microalbuminuria    Total Cholesterol   Date Value Ref Range Status   12/08/2023 124 0 - 200 mg/dL Final   06/20/2023 99 0 - 200 mg/dL Final     Triglycerides   Date Value Ref Range Status   12/08/2023 167 (H) 0 - 150 mg/dL Final   06/20/2023 139 0 - 150 mg/dL Final     HDL Cholesterol   Date Value Ref Range Status   12/08/2023 35 (L) 40 - 60 mg/dL Final   06/20/2023 33 (L) 40 - 60 mg/dL Final     LDL Cholesterol    Date Value Ref Range Status   12/08/2023 61 0 - 100 mg/dL Final   06/20/2023 42 0 - 100 mg/dL Final     Lipid panel collected on 12/8/2023 shows Hypertriglyceridemia and low HDL            Assessment: Patient is here today for follow-up on his diabetes.  He reports he has not been the best on his diet for the past week because he was on vacation.  States he has not been checking his glucose level due to the cost of this test strips.  He wanted to inquire about getting his CGM if insurance would cover it however he is not on insulin or having hypoglycemia so he would not qualify for CGM.  He initially thought his insurance would not cover Farxiga but he received a prescription in the mail 30 days ago and he has been taking it since then.  Reports he is tolerating the Farxiga well without any side effects.  His A1c in the office today is 6.8% which is down from his previous A1c of 7.4% in August.      Diagnoses and all orders for this visit:    1. Type 2 diabetes mellitus with stage 3b chronic kidney disease, without long-term current use of insulin (Primary)    2. Obesity (BMI 30-39.9)    3. Type 2 diabetes mellitus with hemoglobin A1c goal of less  than 7.0%  -     POC Glycosylated Hemoglobin (Hb A1C)  -     glucose blood test strip; Test blood glucose 1 time each day  Dispense: 100 each; Refill: 5  -     dapagliflozin (Farxiga) 5 MG tablet tablet; Take 1 tablet by mouth Daily.  Dispense: 90 tablet; Refill: 1  -     glipizide (GLUCOTROL XL) 10 MG 24 hr tablet; Take 1 tablet by mouth 2 (Two) Times a Day for 180 days.  Dispense: 180 tablet; Refill: 1    Other orders  -     POC Glucose        Plan: No changes were made to his plan of care today.  Sent a prescription for test strips to see if his insurance would cover the cost.  Scheduled a 3-month follow-up and we will recheck his A1c at that time.    The patient will monitor his blood glucose levels once daily.  If he develops problematic hyperglycemia or hypoglycemia or adverse drug reactions, he will contact the office for further instructions.        Follow Up     Return in about 3 months (around 4/12/2024) for Medication Mgmt.    Patient was given instructions and counseling regarding his condition or for health maintenance advice. Please see specific information pulled into the AVS if appropriate.     Michelle Walters, APRN  01/12/2024      Dictated Utilizing Dragon Dictation.  Please note that portions of this note were completed with a voice recognition program.  Part of this note may be an electronic transcription/translation of spoken language to printed text using the Dragon Dictation System.

## 2024-01-12 ENCOUNTER — OFFICE VISIT (OUTPATIENT)
Dept: DIABETES SERVICES | Facility: HOSPITAL | Age: 74
End: 2024-01-12
Payer: MEDICARE

## 2024-01-12 VITALS
DIASTOLIC BLOOD PRESSURE: 68 MMHG | BODY MASS INDEX: 33.84 KG/M2 | WEIGHT: 215.6 LBS | HEART RATE: 63 BPM | HEIGHT: 67 IN | SYSTOLIC BLOOD PRESSURE: 124 MMHG | OXYGEN SATURATION: 97 %

## 2024-01-12 DIAGNOSIS — N18.32 TYPE 2 DIABETES MELLITUS WITH STAGE 3B CHRONIC KIDNEY DISEASE, WITHOUT LONG-TERM CURRENT USE OF INSULIN: Primary | ICD-10-CM

## 2024-01-12 DIAGNOSIS — E11.22 TYPE 2 DIABETES MELLITUS WITH STAGE 3B CHRONIC KIDNEY DISEASE, WITHOUT LONG-TERM CURRENT USE OF INSULIN: Primary | ICD-10-CM

## 2024-01-12 DIAGNOSIS — E11.9 TYPE 2 DIABETES MELLITUS WITH HEMOGLOBIN A1C GOAL OF LESS THAN 7.0%: ICD-10-CM

## 2024-01-12 DIAGNOSIS — E66.9 OBESITY (BMI 30-39.9): ICD-10-CM

## 2024-01-12 LAB
EXPIRATION DATE: ABNORMAL
GLUCOSE BLDC GLUCOMTR-MCNC: 185 MG/DL (ref 70–99)
HBA1C MFR BLD: 6.8 % (ref 4.5–5.7)
Lab: ABNORMAL

## 2024-01-12 PROCEDURE — 3044F HG A1C LEVEL LT 7.0%: CPT | Performed by: NURSE PRACTITIONER

## 2024-01-12 PROCEDURE — 1159F MED LIST DOCD IN RCRD: CPT | Performed by: NURSE PRACTITIONER

## 2024-01-12 PROCEDURE — G0463 HOSPITAL OUTPT CLINIC VISIT: HCPCS | Performed by: NURSE PRACTITIONER

## 2024-01-12 PROCEDURE — 82948 REAGENT STRIP/BLOOD GLUCOSE: CPT | Performed by: NURSE PRACTITIONER

## 2024-01-12 PROCEDURE — 99213 OFFICE O/P EST LOW 20 MIN: CPT | Performed by: NURSE PRACTITIONER

## 2024-01-12 PROCEDURE — 1160F RVW MEDS BY RX/DR IN RCRD: CPT | Performed by: NURSE PRACTITIONER

## 2024-01-12 PROCEDURE — 3078F DIAST BP <80 MM HG: CPT | Performed by: NURSE PRACTITIONER

## 2024-01-12 PROCEDURE — 3074F SYST BP LT 130 MM HG: CPT | Performed by: NURSE PRACTITIONER

## 2024-01-12 RX ORDER — GLIPIZIDE 10 MG/1
10 TABLET, FILM COATED, EXTENDED RELEASE ORAL 2 TIMES DAILY
Qty: 180 TABLET | Refills: 1 | Status: SHIPPED | OUTPATIENT
Start: 2024-01-12 | End: 2024-07-10

## 2024-01-16 ENCOUNTER — OFFICE VISIT (OUTPATIENT)
Dept: ORTHOPEDIC SURGERY | Facility: CLINIC | Age: 74
End: 2024-01-16
Payer: MEDICARE

## 2024-01-16 VITALS — HEART RATE: 63 BPM | OXYGEN SATURATION: 96 % | DIASTOLIC BLOOD PRESSURE: 86 MMHG | SYSTOLIC BLOOD PRESSURE: 130 MMHG

## 2024-01-16 DIAGNOSIS — G56.01 CARPAL TUNNEL SYNDROME OF RIGHT WRIST: Primary | ICD-10-CM

## 2024-01-16 PROCEDURE — 99024 POSTOP FOLLOW-UP VISIT: CPT | Performed by: PHYSICIAN ASSISTANT

## 2024-01-16 PROCEDURE — 1160F RVW MEDS BY RX/DR IN RCRD: CPT | Performed by: PHYSICIAN ASSISTANT

## 2024-01-16 PROCEDURE — 3075F SYST BP GE 130 - 139MM HG: CPT | Performed by: PHYSICIAN ASSISTANT

## 2024-01-16 PROCEDURE — 1159F MED LIST DOCD IN RCRD: CPT | Performed by: PHYSICIAN ASSISTANT

## 2024-01-16 PROCEDURE — 3079F DIAST BP 80-89 MM HG: CPT | Performed by: PHYSICIAN ASSISTANT

## 2024-01-16 NOTE — PROGRESS NOTES
"Chief Complaint  Follow-up of the Right Wrist    Subjective      Lamin Martinez presents to [facility] for follow-up of right carpal tunnel release performed on 11/29/2023 by Dr. Rodriguez.  He presents today for follow-up reporting that his wrist is \"better, still little stiff\".  He reports overall his wrist is \"all right\".  He has been able to return to baseline activities/ADLs.  He declines need for physical therapy.    Objective   No Known Allergies    Vital Signs:   /86   Pulse 63   SpO2 96%       Physical Exam    Constitutional: Awake, alert. Well nourished appearance.    Integumentary: Warm, dry, intact. No obvious rashes.    HENT: Atraumatic, normocephalic.   Respiratory: Non labored respirations .   Cardiovascular: Intact peripheral pulses.    Psychiatric: Normal mood and affect. A&O X3    Ortho Exam  Right wrist: Skin is warm, dry, and intact.  Well-healed surgical scar noted at the palmar aspect of the right wrist.  Patient is able to form a full fist.  Good thumb opposition.  Sensation and distal neurovascular intact.      Imaging Results (Most Recent)       None                      Assessment and Plan   Problem List Items Addressed This Visit    None  Visit Diagnoses       Carpal tunnel syndrome of right wrist    -  Primary          Follow Up   Return if symptoms worsen or fail to improve.    Tobacco Use: Medium Risk (1/16/2024)    Patient History     Smoking Tobacco Use: Former     Smokeless Tobacco Use: Never     Passive Exposure: Not on file     Is a former smoker.  Encouraged continued tobacco cessation.  Did not discuss options for smoking cessation.    Patient Instructions   Patient can discontinue carpal tunnel brace. Discussed option of home exercises vs OT for continued ROM and strengthening efforts. Patient elected to continue home exercises and will call should they decide to pursue therapy. Gradually return to activity as tolerated.     Follow up as " needed. Call with changes or concerns.     Patient was given instructions and counseling regarding his condition or for health maintenance advice. Please see specific information pulled into the AVS if appropriate.

## 2024-01-16 NOTE — PATIENT INSTRUCTIONS
Patient can discontinue carpal tunnel brace. Discussed option of home exercises vs OT for continued ROM and strengthening efforts. Patient elected to continue home exercises and will call should they decide to pursue therapy. Gradually return to activity as tolerated.     Follow up as needed. Call with changes or concerns.

## 2024-01-29 ENCOUNTER — TELEPHONE (OUTPATIENT)
Dept: DIABETES SERVICES | Facility: HOSPITAL | Age: 74
End: 2024-01-29
Payer: MEDICARE

## 2024-01-29 DIAGNOSIS — E78.2 MIXED HYPERLIPIDEMIA: ICD-10-CM

## 2024-01-29 RX ORDER — ATORVASTATIN CALCIUM 40 MG/1
40 TABLET, FILM COATED ORAL NIGHTLY
Qty: 90 TABLET | Refills: 0 | Status: SHIPPED | OUTPATIENT
Start: 2024-01-29

## 2024-01-29 NOTE — TELEPHONE ENCOUNTER
Talked with the pt and let him know he is on PAP for the rest of the year and the PAP company will continue to send him his Farxiga in the mail. Pt understood and said thank you.

## 2024-01-29 NOTE — TELEPHONE ENCOUNTER
Pt left a voicemail stating he only has 30 days left of his farxiga. He stated he received 3 bottles a couple months ago in the mail. He went to [facility] to ask them about this and that it would cost him over 500$ a month. He is wondering what he is to do about this and to call him back.

## 2024-03-27 DIAGNOSIS — E11.65 UNCONTROLLED TYPE 2 DIABETES MELLITUS WITH HYPERGLYCEMIA: ICD-10-CM

## 2024-03-27 RX ORDER — PIOGLITAZONEHYDROCHLORIDE 15 MG/1
15 TABLET ORAL 2 TIMES DAILY
Qty: 60 TABLET | Refills: 5 | Status: SHIPPED | OUTPATIENT
Start: 2024-03-27

## 2024-03-29 DIAGNOSIS — N40.1 BENIGN PROSTATIC HYPERPLASIA WITH LOWER URINARY TRACT SYMPTOMS, SYMPTOM DETAILS UNSPECIFIED: ICD-10-CM

## 2024-03-29 RX ORDER — TAMSULOSIN HYDROCHLORIDE 0.4 MG/1
0.4 CAPSULE ORAL 2 TIMES DAILY
Qty: 90 CAPSULE | Refills: 1 | Status: SHIPPED | OUTPATIENT
Start: 2024-03-29

## 2024-04-11 NOTE — PROGRESS NOTES
Chief Complaint  Diabetes (Follow up - Med Mgmt, A1c Eval)    Referred By: EFRAIN Vizcaino    Subjective          Lamin Martinez presents to [facility] for diabetes medication management    History of Present Illness    Visit type:  follow-up  Diabetes type:  Type 2  Current diabetes status/concerns/issues: States his insurance is no longer covering actos. He brought a letter from his insurance company. States his insurance is no longer covering farxiga. He was prescribed the 5mg dose but his sister had a 10mg dose that she was not using so he started taking the 10mg dose. Admits he has been not checking his glucose because he does not like to stick his fingers. He has been on a CGM in the past but insurance would not cover it since he is not on insulin or having hypoglycemia.   Other health concerns: No new health concerns  Current Diabetes symptoms:    Polyuria: Yes     Polydipsia: No   Polyphagia: No   Blurred vision: No   Excessive fatigue: No  Known Diabetes complications:  Neuropathy: None; Location: N/A  Renal: Stage IIIb moderate (GFR = 30-44 mL/min. He is followed by Selma ZUNIGA at [facility]  Eyes: None; Location: N/A  Amputation/Wounds: None  GI: None  Cardiovascular: Hypertension, Hyperlipidemia, CAD and Other: History of 5 cardiac stents  ED: Patient Reported  Other: None  Hypoglycemia:  None reported at this time  Hypoglycemia Symptoms:  No hypoglycemia at this time  Current diabetes treatment:  Actos 15mg bid, Glipizide 10mg bid, farxiga 10 mg qd   Blood glucose device:  Not currently monitoring BG  Blood glucose monitoring frequency:  Not currently monitoring BG  Blood glucose range/average:  Not currently monitoring BG  Diet:  Limits high carb/sweet foods, Avoids sugary drinks, History of Diabetes Nutrition Counseling - YES. States he is eating a lot of fruits and salads and avoiding potatoes, pasta, bread or rice  Activity/Exercise:   he  walks several days wk short distances      Past Medical History:   Diagnosis Date    CAD (coronary artery disease)     5 stents place, follows with Dr. Bloom    Diabetes     Ave AM -120    Enlarged prostate     History of cardiac cath     Hypertension     PVD (peripheral vascular disease)     follows with Dr. Bloom     Past Surgical History:   Procedure Laterality Date    CARPAL TUNNEL RELEASE Right 2023    Procedure: RIGHT CARPAL TUNNEL RELEASE;  Surgeon: Barbara Rodriguez MD;  Location: [facility];  Service: Orthopedics;  Laterality: Right;    CATARACT EXTRACTION      CORONARY ANGIOPLASTY WITH STENT PLACEMENT      5 stents placed     TONSILLECTOMY       Family History   Problem Relation Age of Onset    Diabetes Sister     Diabetes Brother      Social History     Socioeconomic History    Marital status: Single   Tobacco Use    Smoking status: Former     Current packs/day: 0.00     Average packs/day: 2.0 packs/day for 3.0 years (6.0 ttl pk-yrs)     Types: Cigarettes     Start date: 2019     Quit date: 2022     Years since quittin.2    Smokeless tobacco: Never   Vaping Use    Vaping status: Never Used   Substance and Sexual Activity    Alcohol use: Yes     Comment: occasionally    Drug use: Never     No Known Allergies    Current Outpatient Medications:     Ascorbic Acid 500 MG chewable tablet, Chew 500 mg Daily., Disp: , Rfl:     aspirin 81 MG chewable tablet, Chew 1 tablet Daily. Last dose 23, Disp: , Rfl:     atorvastatin (LIPITOR) 40 MG tablet, TAKE 1 TABLET BY MOUTH ONCE DAILY AT NIGHT, Disp: 90 tablet, Rfl: 0    Blood Glucose Monitoring Suppl device, Use as directed for blood glucose monitoring, Disp: 1 each, Rfl: 0    carvedilol (COREG) 25 MG tablet, Take 1 tablet by mouth 2 (Two) Times a Day With Meals., Disp: , Rfl:     clopidogrel (PLAVIX) 75 MG tablet, Take 1 tablet by mouth once daily, Disp: 90 tablet, Rfl: 1    glipizide (GLUCOTROL XL) 10 MG 24 hr tablet, Take 1  "tablet by mouth 2 (Two) Times a Day for 180 days., Disp: 180 tablet, Rfl: 1    glucose blood test strip, Test blood glucose 1 time each day, Disp: 100 each, Rfl: 5    isosorbide mononitrate (IMDUR) 30 MG 24 hr tablet, Take 1 tablet by mouth Daily., Disp: 90 tablet, Rfl: 1    Lancets misc, Test blood glucose 1 time each day, Disp: 100 each, Rfl: 5    lisinopril (PRINIVIL,ZESTRIL) 10 MG tablet, Take 1 tablet by mouth Daily., Disp: 90 tablet, Rfl: 1    multivitamin (THERAGRAN) tablet tablet, Take 1 tablet by mouth Daily., Disp: , Rfl:     pioglitazone (ACTOS) 15 MG tablet, Take 1 tablet by mouth 2 (Two) Times a Day., Disp: 60 tablet, Rfl: 5    tamsulosin (FLOMAX) 0.4 MG capsule 24 hr capsule, Take 1 capsule by mouth 2 (Two) Times a Day., Disp: 90 capsule, Rfl: 1    vitamin D3 125 MCG (5000 UT) capsule capsule, Take 1 capsule by mouth Daily., Disp: 90 capsule, Rfl: 1    dapagliflozin Propanediol (Farxiga) 10 MG tablet, Take 10 mg by mouth Daily for 180 days., Disp: 90 tablet, Rfl: 1    Objective     Vitals:    04/12/24 1355   BP: 128/54   BP Location: Left arm   Patient Position: Sitting   Cuff Size: Adult   Pulse: 61   Temp: 97.7 °F (36.5 °C)   TempSrc: Temporal   SpO2: 97%   Weight: 98.2 kg (216 lb 6.4 oz)   Height: 170.2 cm (67\")     Body mass index is 33.89 kg/m².    Physical Exam  Constitutional:       Appearance: Normal appearance. He is obese.      Comments: Obesity (BMI 30 - 39.9) Pt Current BMI = 33.89     HENT:      Head: Normocephalic and atraumatic.      Right Ear: External ear normal.      Left Ear: External ear normal.      Nose: Nose normal.   Eyes:      Extraocular Movements: Extraocular movements intact.      Conjunctiva/sclera: Conjunctivae normal.   Pulmonary:      Effort: Pulmonary effort is normal.   Musculoskeletal:         General: Normal range of motion.      Cervical back: Normal range of motion.   Skin:     General: Skin is warm and dry.   Neurological:      General: No focal deficit present. "      Mental Status: He is alert and oriented to person, place, and time. Mental status is at baseline.   Psychiatric:         Mood and Affect: Mood normal.         Behavior: Behavior normal.         Thought Content: Thought content normal.         Judgment: Judgment normal.             Result Review :   The following data was reviewed by: EFRAIN Quesada on 04/12/2024:    Most Recent A1C          4/12/2024    14:11   HGBA1C Most Recent   Hemoglobin A1C 7.2        A1C Last 3 Results          8/11/2023    15:51 1/12/2024    15:35 4/12/2024    14:11   HGBA1C Last 3 Results   Hemoglobin A1C 7.4  6.8  7.2      A1c collected in the office today is 7.2%, indicating Uncontrolled Type II diabetes.  This result is up from the prior result of 6.8% collected on 1/12/2024    Glucose   Date Value Ref Range Status   04/12/2024 121 (H) 70 - 99 mg/dL Final     Comment:     Serial Number: [id number]:  [id number]     Point of care glucose in the office today is within normal limits for nonfasting glucose    Creatinine   Date Value Ref Range Status   12/08/2023 1.79 (H) 0.76 - 1.27 mg/dL Final   06/20/2023 1.93 (H) 0.76 - 1.27 mg/dL Final     eGFR   Date Value Ref Range Status   12/08/2023 39.3 (L) >60.0 mL/min/1.73 Final   06/20/2023 36.1 (L) >60.0 mL/min/1.73 Final     Labs collected on 12/8/2023 show Stage IIIb moderate (GFR = 30-44 mL/min    Microalbumin, Urine   Date Value Ref Range Status   06/20/2023 3.5 mg/dL Final     Creatinine, Urine   Date Value Ref Range Status   12/08/2023 86.4 mg/dL Final   06/20/2023 107.9 mg/dL Final     Microalbumin/Creatinine Ratio   Date Value Ref Range Status   06/20/2023 32.4 mg/g Final     Urine microalbuminuria collected on 6/20/2023 is positive for microalbuminuria    Total Cholesterol   Date Value Ref Range Status   12/08/2023 124 0 - 200 mg/dL Final   06/20/2023 99 0 - 200 mg/dL Final     Triglycerides   Date Value Ref Range Status   12/08/2023 167 (H) 0 - 150 mg/dL Final    06/20/2023 139 0 - 150 mg/dL Final     HDL Cholesterol   Date Value Ref Range Status   12/08/2023 35 (L) 40 - 60 mg/dL Final   06/20/2023 33 (L) 40 - 60 mg/dL Final     LDL Cholesterol    Date Value Ref Range Status   12/08/2023 61 0 - 100 mg/dL Final   06/20/2023 42 0 - 100 mg/dL Final     Lipid panel collected on 12/8/2023 shows Hypertriglyceridemia and low HDL            Assessment: Patient is here today for 3-month follow-up on his diabetes.States his insurance is no longer covering actos. He brought a letter from his insurance company. States his insurance is no longer covering farxiga. He was prescribed the 5mg dose but his sister had a 10mg dose that she was not using so he started taking the 10mg dose. Admits he has been not checking his glucose because he does not like to stick his fingers. He has been on a CGM in the past but insurance would not cover it since he is not on insulin or having hypoglycemia.  His A1c in the office today 7.2% which is up from his previous A1c of 6.8% January.      Diagnoses and all orders for this visit:    1. Type 2 diabetes mellitus with stage 3b chronic kidney disease, without long-term current use of insulin (Primary)  -     POC Glycosylated Hemoglobin (Hb A1C)    2. Uncontrolled type 2 diabetes mellitus with hyperglycemia  -     pioglitazone (ACTOS) 15 MG tablet; Take 1 tablet by mouth 2 (Two) Times a Day.  Dispense: 60 tablet; Refill: 5  -     dapagliflozin Propanediol (Farxiga) 10 MG tablet; Take 10 mg by mouth Daily for 180 days.  Dispense: 90 tablet; Refill: 1    3. Obesity (BMI 30-39.9)    Other orders  -     POC Glucose        Plan: Sent in a prescription for generic Farxiga (dapagliflozin) as an alternative to brand-name Farxiga.  Increased the Farxiga from 5 mg once daily to 10 mg once daily.  According to his insurance he can only get a 30-day supply of Actos at a time so prescription was sent for a 30-day supply with 5 refills.  No other changes were made at  today's visit.  Scheduled a 3-month follow-up and we will recheck an A1c at that time.          Follow Up     Return in about 3 months (around 7/12/2024) for Medication Mgmt.    Patient was given instructions and counseling regarding his condition or for health maintenance advice. Please see specific information pulled into the AVS if appropriate.     EFRAIN Quesada  04/12/2024      Dictated Utilizing Dragon Dictation.  Please note that portions of this note were completed with a voice recognition program.  Part of this note may be an electronic transcription/translation of spoken language to printed text using the Dragon Dictation System.

## 2024-04-12 ENCOUNTER — OFFICE VISIT (OUTPATIENT)
Dept: DIABETES SERVICES | Facility: HOSPITAL | Age: 74
End: 2024-04-12
Payer: MEDICARE

## 2024-04-12 VITALS
DIASTOLIC BLOOD PRESSURE: 54 MMHG | TEMPERATURE: 97.7 F | HEIGHT: 67 IN | WEIGHT: 216.4 LBS | OXYGEN SATURATION: 97 % | SYSTOLIC BLOOD PRESSURE: 128 MMHG | HEART RATE: 61 BPM | BODY MASS INDEX: 33.97 KG/M2

## 2024-04-12 DIAGNOSIS — E66.9 OBESITY (BMI 30-39.9): ICD-10-CM

## 2024-04-12 DIAGNOSIS — E11.22 TYPE 2 DIABETES MELLITUS WITH STAGE 3B CHRONIC KIDNEY DISEASE, WITHOUT LONG-TERM CURRENT USE OF INSULIN: Primary | ICD-10-CM

## 2024-04-12 DIAGNOSIS — E11.65 UNCONTROLLED TYPE 2 DIABETES MELLITUS WITH HYPERGLYCEMIA: ICD-10-CM

## 2024-04-12 DIAGNOSIS — N18.32 TYPE 2 DIABETES MELLITUS WITH STAGE 3B CHRONIC KIDNEY DISEASE, WITHOUT LONG-TERM CURRENT USE OF INSULIN: Primary | ICD-10-CM

## 2024-04-12 LAB
EXPIRATION DATE: ABNORMAL
GLUCOSE BLDC GLUCOMTR-MCNC: 121 MG/DL (ref 70–99)
HBA1C MFR BLD: 7.2 % (ref 4.5–5.7)
Lab: ABNORMAL

## 2024-04-12 PROCEDURE — G0463 HOSPITAL OUTPT CLINIC VISIT: HCPCS | Performed by: NURSE PRACTITIONER

## 2024-04-12 PROCEDURE — 3078F DIAST BP <80 MM HG: CPT | Performed by: NURSE PRACTITIONER

## 2024-04-12 PROCEDURE — 99214 OFFICE O/P EST MOD 30 MIN: CPT | Performed by: NURSE PRACTITIONER

## 2024-04-12 PROCEDURE — 1159F MED LIST DOCD IN RCRD: CPT | Performed by: NURSE PRACTITIONER

## 2024-04-12 PROCEDURE — 3051F HG A1C>EQUAL 7.0%<8.0%: CPT | Performed by: NURSE PRACTITIONER

## 2024-04-12 PROCEDURE — 1160F RVW MEDS BY RX/DR IN RCRD: CPT | Performed by: NURSE PRACTITIONER

## 2024-04-12 PROCEDURE — 3074F SYST BP LT 130 MM HG: CPT | Performed by: NURSE PRACTITIONER

## 2024-04-12 PROCEDURE — 82948 REAGENT STRIP/BLOOD GLUCOSE: CPT | Performed by: NURSE PRACTITIONER

## 2024-04-12 RX ORDER — PIOGLITAZONEHYDROCHLORIDE 15 MG/1
15 TABLET ORAL 2 TIMES DAILY
Qty: 60 TABLET | Refills: 5 | Status: SHIPPED | OUTPATIENT
Start: 2024-04-12

## 2024-04-12 RX ORDER — DAPAGLIFLOZIN 10 MG/1
10 TABLET, FILM COATED ORAL DAILY
Qty: 90 TABLET | Refills: 1 | Status: SHIPPED | OUTPATIENT
Start: 2024-04-12 | End: 2024-10-09

## 2024-04-15 ENCOUNTER — OFFICE VISIT (OUTPATIENT)
Dept: FAMILY MEDICINE CLINIC | Age: 74
End: 2024-04-15
Payer: MEDICARE

## 2024-04-15 ENCOUNTER — TELEPHONE (OUTPATIENT)
Dept: DIABETES SERVICES | Facility: HOSPITAL | Age: 74
End: 2024-04-15
Payer: MEDICARE

## 2024-04-15 VITALS
TEMPERATURE: 98 F | DIASTOLIC BLOOD PRESSURE: 66 MMHG | OXYGEN SATURATION: 96 % | HEIGHT: 67 IN | HEART RATE: 63 BPM | BODY MASS INDEX: 33.84 KG/M2 | SYSTOLIC BLOOD PRESSURE: 119 MMHG | WEIGHT: 215.6 LBS

## 2024-04-15 DIAGNOSIS — E11.9 TYPE 2 DIABETES MELLITUS WITHOUT COMPLICATION, WITHOUT LONG-TERM CURRENT USE OF INSULIN: Primary | ICD-10-CM

## 2024-04-15 DIAGNOSIS — R53.1 WEAKNESS: ICD-10-CM

## 2024-04-15 DIAGNOSIS — I25.119 CORONARY ARTERY DISEASE INVOLVING NATIVE HEART WITH ANGINA PECTORIS, UNSPECIFIED VESSEL OR LESION TYPE: ICD-10-CM

## 2024-04-15 DIAGNOSIS — R29.6 FREQUENT FALLS: ICD-10-CM

## 2024-04-15 DIAGNOSIS — I10 PRIMARY HYPERTENSION: ICD-10-CM

## 2024-04-15 DIAGNOSIS — Z12.5 PROSTATE CANCER SCREENING: ICD-10-CM

## 2024-04-15 DIAGNOSIS — E78.2 MIXED HYPERLIPIDEMIA: ICD-10-CM

## 2024-04-15 DIAGNOSIS — N40.1 BENIGN PROSTATIC HYPERPLASIA WITH LOWER URINARY TRACT SYMPTOMS, SYMPTOM DETAILS UNSPECIFIED: ICD-10-CM

## 2024-04-15 PROCEDURE — G2211 COMPLEX E/M VISIT ADD ON: HCPCS | Performed by: NURSE PRACTITIONER

## 2024-04-15 PROCEDURE — 1160F RVW MEDS BY RX/DR IN RCRD: CPT | Performed by: NURSE PRACTITIONER

## 2024-04-15 PROCEDURE — 3074F SYST BP LT 130 MM HG: CPT | Performed by: NURSE PRACTITIONER

## 2024-04-15 PROCEDURE — 3078F DIAST BP <80 MM HG: CPT | Performed by: NURSE PRACTITIONER

## 2024-04-15 PROCEDURE — 1159F MED LIST DOCD IN RCRD: CPT | Performed by: NURSE PRACTITIONER

## 2024-04-15 PROCEDURE — 3051F HG A1C>EQUAL 7.0%<8.0%: CPT | Performed by: NURSE PRACTITIONER

## 2024-04-15 PROCEDURE — 99214 OFFICE O/P EST MOD 30 MIN: CPT | Performed by: NURSE PRACTITIONER

## 2024-04-15 NOTE — TELEPHONE ENCOUNTER
Lamin Martinez (Key: [id number])  Rx #: [id number]  Dapagliflozin Propanediol 10MG tabletsApprovedtoday  Approved. This drug has been approved under the Member's Medicare Part D benefit. Approved quantity: 90 units per 90 day(s). You may fill up to a 90 day supply except for those on Specialty Tier 5, which can be filled up to a 30 day supply. Please call the pharmacy to process the prescription claim.

## 2024-04-15 NOTE — PROGRESS NOTES
"Chief Complaint  Diabetes (Follow up )    Subjective          Lamin Martinez presents to [facility]    Fell in [facility]. Is interested in getting a power chair. Pt is on blood thinner. Wound to his face continued bleeding for 24 hrs. Had to get otc Bleed stop to help. He has had several falls recently.     Pt is currently taking flomax 0.4mg BID, but feels as if it's not helping as much as it used to. Doesn't feel as if he's emptying out his bladder. Would like a referral to urology.     Pt is seeing nephrology later this month and will be getting labs.       Objective   Vital Signs:   Vitals:    04/15/24 1305   BP: 119/66   BP Location: Right arm   Patient Position: Sitting   Pulse: 63   Temp: 98 °F (36.7 °C)   TempSrc: Oral   SpO2: 96%   Weight: 97.8 kg (215 lb 9.6 oz)   Height: 170.2 cm (67.01\")       Body mass index is 33.76 kg/m².         Physical Exam  Vitals reviewed.   Constitutional:       General: He is not in acute distress.     Appearance: Normal appearance. He is well-developed.   HENT:      Head: Normocephalic and atraumatic.   Eyes:      Conjunctiva/sclera: Conjunctivae normal.      Pupils: Pupils are equal, round, and reactive to light.   Neck:      Vascular: No carotid bruit.   Cardiovascular:      Rate and Rhythm: Normal rate and regular rhythm.      Heart sounds: Normal heart sounds. No murmur heard.  Pulmonary:      Effort: Pulmonary effort is normal. No respiratory distress.      Breath sounds: Normal breath sounds.   Skin:     General: Skin is warm and dry.   Neurological:      Mental Status: He is alert and oriented to person, place, and time.   Psychiatric:         Mood and Affect: Mood and affect normal.         Behavior: Behavior normal.         Thought Content: Thought content normal.         Judgment: Judgment normal.            Lab Results   Component Value Date    GLUCOSE 165 (H) 12/08/2023    BUN 27 (H) 12/08/2023    CREATININE 1.79 " (H) 12/08/2023    EGFR 39.3 (L) 12/08/2023    BCR 15.1 12/08/2023    K 5.0 12/08/2023    CO2 24.5 12/08/2023    CALCIUM 9.5 12/08/2023    ALBUMIN 4.7 12/08/2023    BILITOT 0.4 12/08/2023    AST 19 12/08/2023    ALT 18 12/08/2023     Lab Results   Component Value Date    HGBA1C 7.2 (A) 04/12/2024     Lab Results   Component Value Date    WBC 6.33 12/08/2023    HGB 15.1 12/08/2023    HCT 47.2 12/08/2023    MCV 98.1 (H) 12/08/2023     12/08/2023     Lab Results   Component Value Date    CHOL 124 12/08/2023    CHOL 99 06/20/2023     Lab Results   Component Value Date    TRIG 167 (H) 12/08/2023    TRIG 139 06/20/2023     Lab Results   Component Value Date    HDL 35 (L) 12/08/2023    HDL 33 (L) 06/20/2023     Lab Results   Component Value Date    LDL 61 12/08/2023    LDL 42 06/20/2023     Lab Results   Component Value Date    TSH 3.500 06/20/2023              Assessment and Plan    Assessment & Plan  Type 2 diabetes mellitus without complication, without long-term current use of insulin   Continue f/u with endo.   Primary hypertension   very well controlled. Continue coreg 25mg BID, lisinopril 10mg dialy.   Mixed hyperlipidemia    Continue atorvastatin 40mg daily   Benign prostatic hyperplasia with lower urinary tract symptoms, symptom details unspecified  Referral initiated. Continue flomax as rx.   Prostate cancer screening  Will notify patient of results and treat accordingly.     Weakness  Power chair ordered.   Frequent falls  Power chair ordered. Declines physical therapy.     Orders Placed This Encounter   Procedures    Comprehensive Metabolic Panel    Lipid Panel    PSA Screen    Lipid Panel    Comprehensive Metabolic Panel    Ambulatory Referral to Urology            Patient to notify office with any acute concerns or issues.  Patient verbalizes understanding, agrees with plan of care and has no further questions upon discharge.    Please note that portions of this note were completed with a voice  recognition program.      Follow Up    Return in about 6 months (around 10/15/2024) for Medicare Wellness.  Patient was given instructions and counseling regarding his condition or for health maintenance advice. Please see specific information pulled into the AVS if appropriate.

## 2024-04-18 RX ORDER — ISOSORBIDE MONONITRATE 30 MG/1
30 TABLET, EXTENDED RELEASE ORAL DAILY
Qty: 90 TABLET | Refills: 1 | Status: SHIPPED | OUTPATIENT
Start: 2024-04-18

## 2024-04-22 DIAGNOSIS — E78.2 MIXED HYPERLIPIDEMIA: ICD-10-CM

## 2024-04-22 RX ORDER — ATORVASTATIN CALCIUM 40 MG/1
40 TABLET, FILM COATED ORAL NIGHTLY
Qty: 90 TABLET | Refills: 1 | Status: SHIPPED | OUTPATIENT
Start: 2024-04-22

## 2024-04-26 NOTE — PROGRESS NOTES
Spoke to pt about the process of getting a power wheelchair to see if I can get that started for him and he wanted to wait a while before doing that.  States he did not think he was bad enough to need a power chair right now.

## 2024-04-30 ENCOUNTER — OFFICE VISIT (OUTPATIENT)
Dept: UROLOGY | Facility: CLINIC | Age: 74
End: 2024-04-30
Payer: MEDICARE

## 2024-04-30 VITALS
SYSTOLIC BLOOD PRESSURE: 147 MMHG | WEIGHT: 218.4 LBS | DIASTOLIC BLOOD PRESSURE: 70 MMHG | HEIGHT: 67 IN | BODY MASS INDEX: 34.28 KG/M2 | HEART RATE: 59 BPM

## 2024-04-30 DIAGNOSIS — N13.8 BPH WITH OBSTRUCTION/LOWER URINARY TRACT SYMPTOMS: ICD-10-CM

## 2024-04-30 DIAGNOSIS — N52.01 ERECTILE DYSFUNCTION DUE TO ARTERIAL INSUFFICIENCY: Primary | ICD-10-CM

## 2024-04-30 DIAGNOSIS — E11.65 TYPE 2 DIABETES MELLITUS WITH HYPERGLYCEMIA, WITHOUT LONG-TERM CURRENT USE OF INSULIN: ICD-10-CM

## 2024-04-30 DIAGNOSIS — N40.1 BPH WITH OBSTRUCTION/LOWER URINARY TRACT SYMPTOMS: ICD-10-CM

## 2024-04-30 LAB
BILIRUB BLD-MCNC: NEGATIVE MG/DL
CLARITY, POC: CLEAR
COLOR UR: YELLOW
EXPIRATION DATE: ABNORMAL
GLUCOSE UR STRIP-MCNC: ABNORMAL MG/DL
KETONES UR QL: NEGATIVE
LEUKOCYTE EST, POC: NEGATIVE
Lab: ABNORMAL
NITRITE UR-MCNC: NEGATIVE MG/ML
PH UR: 6 [PH] (ref 5–8)
PROT UR STRIP-MCNC: NEGATIVE MG/DL
RBC # UR STRIP: NEGATIVE /UL
SP GR UR: 1.02 (ref 1–1.03)
UROBILINOGEN UR QL: ABNORMAL

## 2024-04-30 RX ORDER — TADALAFIL 5 MG/1
5 TABLET ORAL DAILY PRN
Qty: 90 TABLET | Refills: 1 | Status: SHIPPED | OUTPATIENT
Start: 2024-04-30 | End: 2024-10-27

## 2024-04-30 NOTE — PROGRESS NOTES
"Chief Complaint  Erectile dysfunction.    BPH with obstructive uropathy    Subjective no acute distress        Lamin Martinez presents to [facility]  History of Present Illness    73-year-old white male has erectile dysfunction for for the last 2 years.  Patient has tried Viagra and Cialis without any help.  Patient was  10 years ago but now he has a partner but does not get erection.  He has not had Cialis for a long time.  Patient has retrograde ejaculation probably from diabetes mellitus.  He does get climax on masturbation.    Has been having difficulty with urination but so long as he is on tamsulosin it works.  AUA score is 12/35 quality score is 2.  He has no dysuria or gross hematuria.    Objective no acute distress  Vital Signs:   /70 (BP Location: Left arm, Patient Position: Sitting, Cuff Size: Adult)   Pulse 59   Ht 170.2 cm (67.01\")   Wt 99.1 kg (218 lb 6.4 oz)   BMI 34.20 kg/m²     No Known Allergies   Past medical history:  has a past medical history of CAD (coronary artery disease), Diabetes, Enlarged prostate, History of cardiac cath, Hypertension, and PVD (peripheral vascular disease).   Past surgical history:  has a past surgical history that includes Coronary angioplasty with stent; Tonsillectomy; Cataract extraction; and Carpal tunnel release (Right, 11/29/2023).  Personal history: family history includes Diabetes in his brother and sister.  Social history:  reports that he quit smoking about 2 years ago. His smoking use included cigarettes. He started smoking about 5 years ago. He has a 6 pack-year smoking history. He has been exposed to tobacco smoke. He has never used smokeless tobacco. He reports current alcohol use. He reports that he does not use drugs.    Review of Systems    Please see past medical and surgical history rest of the system is okay.    Physical Exam  Constitutional:       General: He is not in acute distress.     Appearance: " Normal appearance. He is obese. He is not ill-appearing or toxic-appearing.   HENT:      Head: Normocephalic and atraumatic.      Ears:      Comments: No hearing loss  Cardiovascular:      Rate and Rhythm: Normal rate and regular rhythm.      Pulses: Normal pulses.      Heart sounds: Normal heart sounds. No murmur heard.  Pulmonary:      Effort: Pulmonary effort is normal. No respiratory distress.      Breath sounds: Normal breath sounds. No rhonchi or rales.   Abdominal:      General: There is no distension.      Palpations: There is no mass.      Tenderness: There is no right CVA tenderness, left CVA tenderness or rebound.   Genitourinary:     Comments: Penis is normal.  Penis is circumcised    Right and left scrotum is normal.    Right and left testicle and epididymis is normal.    SUKHWINDER.  Prostate gland is just about 35 g and benign.  Patient is constipated.      Musculoskeletal:         General: Normal range of motion.      Cervical back: Normal range of motion and neck supple. No rigidity or tenderness.   Lymphadenopathy:      Cervical: No cervical adenopathy.   Skin:     General: Skin is warm.      Coloration: Skin is not jaundiced.   Neurological:      General: No focal deficit present.      Mental Status: He is alert and oriented to person, place, and time.      Motor: No weakness.      Gait: Gait normal.   Psychiatric:         Mood and Affect: Mood normal.         Behavior: Behavior normal.         Thought Content: Thought content normal.         Judgment: Judgment normal.        Result Review :                 Assessment and Plan    Diagnoses and all orders for this visit:    1. BPH with obstruction/lower urinary tract symptoms (Primary)  -     Cancel: POC Urinalysis Dipstick, Automated  -     POC Urinalysis Dipstick, Automated    2. Erectile dysfunction due to arterial insufficiency    3. Type 2 diabetes mellitus with hyperglycemia, without long-term current use of insulin    Will continue tamsulosin for  BPH.  I have discussed with him intra penile injections but he is not interested.  He can also try penile implant but is going to think about.  He wants to retry tadalafil which will start with 5 mg daily.  Also discussed with himEroxon which he can get from [employer].  Will recheck him in 2 months time.     Brief Urine Lab Results  (Last result in the past 365 days)        Color   Clarity   Blood   Leuk Est   Nitrite   Protein   CREAT   Urine HCG        04/30/24 1420 Yellow   Clear   Negative   Negative   Negative   Negative                    Follow Up   No follow-ups on file.  Patient was given instructions and counseling regarding his condition or for health maintenance advice. Please see specific information pulled into the AVS if appropriate.     Marcel Villafana MD

## 2024-05-01 ENCOUNTER — LAB (OUTPATIENT)
Dept: LAB | Facility: HOSPITAL | Age: 74
End: 2024-05-01
Payer: MEDICARE

## 2024-05-01 ENCOUNTER — TRANSCRIBE ORDERS (OUTPATIENT)
Dept: ADMINISTRATIVE | Facility: HOSPITAL | Age: 74
End: 2024-05-01
Payer: MEDICARE

## 2024-05-01 ENCOUNTER — PATIENT ROUNDING (BHMG ONLY) (OUTPATIENT)
Dept: UROLOGY | Facility: CLINIC | Age: 74
End: 2024-05-01
Payer: MEDICARE

## 2024-05-01 DIAGNOSIS — I10 PRIMARY HYPERTENSION: ICD-10-CM

## 2024-05-01 DIAGNOSIS — N18.30 STAGE 3 CHRONIC KIDNEY DISEASE, UNSPECIFIED WHETHER STAGE 3A OR 3B CKD: Primary | ICD-10-CM

## 2024-05-01 DIAGNOSIS — N18.30 STAGE 3 CHRONIC KIDNEY DISEASE, UNSPECIFIED WHETHER STAGE 3A OR 3B CKD: ICD-10-CM

## 2024-05-01 DIAGNOSIS — I10 HYPERTENSION, ESSENTIAL: ICD-10-CM

## 2024-05-01 LAB
ALBUMIN SERPL-MCNC: 4.3 G/DL (ref 3.5–5.2)
ALBUMIN/GLOB SERPL: 1.9 G/DL
ALP SERPL-CCNC: 69 U/L (ref 39–117)
ALT SERPL W P-5'-P-CCNC: 16 U/L (ref 1–41)
ANION GAP SERPL CALCULATED.3IONS-SCNC: 11 MMOL/L (ref 5–15)
AST SERPL-CCNC: 11 U/L (ref 1–40)
BILIRUB SERPL-MCNC: 0.3 MG/DL (ref 0–1.2)
BUN SERPL-MCNC: 24 MG/DL (ref 8–23)
BUN/CREAT SERPL: 15 (ref 7–25)
CALCIUM SPEC-SCNC: 9.3 MG/DL (ref 8.6–10.5)
CHLORIDE SERPL-SCNC: 108 MMOL/L (ref 98–107)
CHOLEST SERPL-MCNC: 109 MG/DL (ref 0–200)
CO2 SERPL-SCNC: 26 MMOL/L (ref 22–29)
CREAT SERPL-MCNC: 1.6 MG/DL (ref 0.76–1.27)
DEPRECATED RDW RBC AUTO: 56.3 FL (ref 37–54)
EGFRCR SERPLBLD CKD-EPI 2021: 44.9 ML/MIN/1.73
ERYTHROCYTE [DISTWIDTH] IN BLOOD BY AUTOMATED COUNT: 15.2 % (ref 12.3–15.4)
GLOBULIN UR ELPH-MCNC: 2.3 GM/DL
GLUCOSE SERPL-MCNC: 157 MG/DL (ref 65–99)
HCT VFR BLD AUTO: 45.5 % (ref 37.5–51)
HDLC SERPL-MCNC: 33 MG/DL (ref 40–60)
HGB BLD-MCNC: 14.7 G/DL (ref 13–17.7)
LDLC SERPL CALC-MCNC: 49 MG/DL (ref 0–100)
LDLC/HDLC SERPL: 1.33 {RATIO}
MCH RBC QN AUTO: 31.7 PG (ref 26.6–33)
MCHC RBC AUTO-ENTMCNC: 32.3 G/DL (ref 31.5–35.7)
MCV RBC AUTO: 98.3 FL (ref 79–97)
PLATELET # BLD AUTO: 153 10*3/MM3 (ref 140–450)
PMV BLD AUTO: 10.4 FL (ref 6–12)
POTASSIUM SERPL-SCNC: 4.5 MMOL/L (ref 3.5–5.2)
PROT SERPL-MCNC: 6.6 G/DL (ref 6–8.5)
RBC # BLD AUTO: 4.63 10*6/MM3 (ref 4.14–5.8)
SODIUM SERPL-SCNC: 145 MMOL/L (ref 136–145)
TRIGL SERPL-MCNC: 161 MG/DL (ref 0–150)
VLDLC SERPL-MCNC: 27 MG/DL (ref 5–40)
WBC NRBC COR # BLD AUTO: 6.68 10*3/MM3 (ref 3.4–10.8)

## 2024-05-01 PROCEDURE — 85027 COMPLETE CBC AUTOMATED: CPT

## 2024-05-01 PROCEDURE — 36415 COLL VENOUS BLD VENIPUNCTURE: CPT

## 2024-05-01 PROCEDURE — 80053 COMPREHEN METABOLIC PANEL: CPT

## 2024-05-01 PROCEDURE — 80061 LIPID PANEL: CPT

## 2024-05-09 ENCOUNTER — TELEPHONE (OUTPATIENT)
Dept: DIABETES SERVICES | Facility: HOSPITAL | Age: 74
End: 2024-05-09
Payer: MEDICARE

## 2024-05-09 DIAGNOSIS — E11.65 UNCONTROLLED TYPE 2 DIABETES MELLITUS WITH HYPERGLYCEMIA: ICD-10-CM

## 2024-05-09 RX ORDER — DAPAGLIFLOZIN 10 MG/1
10 TABLET, FILM COATED ORAL DAILY
Qty: 90 TABLET | Refills: 3 | Status: SHIPPED | OUTPATIENT
Start: 2024-05-09 | End: 2024-11-05

## 2024-05-17 DIAGNOSIS — I10 PRIMARY HYPERTENSION: ICD-10-CM

## 2024-05-17 RX ORDER — LISINOPRIL 10 MG/1
10 TABLET ORAL DAILY
Qty: 90 TABLET | Refills: 0 | Status: SHIPPED | OUTPATIENT
Start: 2024-05-17

## 2024-06-17 DIAGNOSIS — I25.119 CORONARY ARTERY DISEASE INVOLVING NATIVE HEART WITH ANGINA PECTORIS, UNSPECIFIED VESSEL OR LESION TYPE: ICD-10-CM

## 2024-06-18 RX ORDER — CLOPIDOGREL BISULFATE 75 MG/1
75 TABLET ORAL DAILY
Qty: 90 TABLET | Refills: 1 | Status: SHIPPED | OUTPATIENT
Start: 2024-06-18

## 2024-06-22 DIAGNOSIS — N40.1 BENIGN PROSTATIC HYPERPLASIA WITH LOWER URINARY TRACT SYMPTOMS, SYMPTOM DETAILS UNSPECIFIED: ICD-10-CM

## 2024-06-24 RX ORDER — TAMSULOSIN HYDROCHLORIDE 0.4 MG/1
1 CAPSULE ORAL 2 TIMES DAILY
Qty: 180 CAPSULE | Refills: 1 | Status: SHIPPED | OUTPATIENT
Start: 2024-06-24

## 2024-06-24 NOTE — TELEPHONE ENCOUNTER
Sig says BID, but you only sent #90.   Bill For Individual Tests Below?: no Number Of Tubes Drawn: 3 Detail Level: None Venipuncture Paragraph: An alcohol pad was applied to the venipuncture site. Venipuncture was performed using a butterfly needle. Pressure and a bandaid was applied to the site. No complications were noted.

## 2024-06-27 DIAGNOSIS — E11.65 UNCONTROLLED TYPE 2 DIABETES MELLITUS WITH HYPERGLYCEMIA: ICD-10-CM

## 2024-07-01 RX ORDER — PIOGLITAZONEHYDROCHLORIDE 15 MG/1
15 TABLET ORAL 2 TIMES DAILY
Qty: 180 TABLET | Refills: 0 | Status: SHIPPED | OUTPATIENT
Start: 2024-07-01 | End: 2024-07-02 | Stop reason: DRUGHIGH

## 2024-07-02 ENCOUNTER — PRIOR AUTHORIZATION (OUTPATIENT)
Dept: DIABETES SERVICES | Facility: HOSPITAL | Age: 74
End: 2024-07-02
Payer: MEDICARE

## 2024-07-02 DIAGNOSIS — E11.65 UNCONTROLLED TYPE 2 DIABETES MELLITUS WITH HYPERGLYCEMIA: Primary | ICD-10-CM

## 2024-07-02 RX ORDER — PIOGLITAZONEHYDROCHLORIDE 30 MG/1
30 TABLET ORAL 2 TIMES DAILY
COMMUNITY
End: 2024-07-02 | Stop reason: SDUPTHER

## 2024-07-02 RX ORDER — PIOGLITAZONEHYDROCHLORIDE 30 MG/1
30 TABLET ORAL DAILY
Qty: 90 TABLET | Refills: 1 | Status: SHIPPED | OUTPATIENT
Start: 2024-07-02

## 2024-07-02 NOTE — TELEPHONE ENCOUNTER
Per Michelle, since insurance will not pay for actos 15 mg 2 times a day, change to actos 30 mg 1 time a day. 90 tablets with 1 refill

## 2024-07-02 NOTE — TELEPHONE ENCOUNTER
Pioglitazone HCl 15MG tablets    Key: [id number]      PA Case ID #: [id number]  Rx #: [id number]    [facility] has not [facility] your [facility] request. You may close this dialog, return to your dashboard, and perform other tasks.    To check for an update later, open this request again from your dashboard.    If [facility] has not replied to your request within 24 hours for urgent requests or 72 hours for standard requests, please reach out to the plan using the phone number located on the back on the member's insurance card.

## 2024-08-21 DIAGNOSIS — I10 PRIMARY HYPERTENSION: ICD-10-CM

## 2024-08-21 RX ORDER — LISINOPRIL 10 MG/1
10 TABLET ORAL DAILY
Qty: 90 TABLET | Refills: 0 | Status: SHIPPED | OUTPATIENT
Start: 2024-08-21

## 2024-08-26 ENCOUNTER — OFFICE VISIT (OUTPATIENT)
Dept: FAMILY MEDICINE CLINIC | Age: 74
End: 2024-08-26
Payer: MEDICARE

## 2024-08-26 ENCOUNTER — TRANSCRIBE ORDERS (OUTPATIENT)
Dept: LAB | Facility: HOSPITAL | Age: 74
End: 2024-08-26
Payer: MEDICARE

## 2024-08-26 ENCOUNTER — LAB (OUTPATIENT)
Dept: LAB | Facility: HOSPITAL | Age: 74
End: 2024-08-26
Payer: MEDICARE

## 2024-08-26 VITALS
DIASTOLIC BLOOD PRESSURE: 80 MMHG | OXYGEN SATURATION: 97 % | TEMPERATURE: 97.5 F | HEART RATE: 76 BPM | HEIGHT: 67 IN | SYSTOLIC BLOOD PRESSURE: 128 MMHG | BODY MASS INDEX: 34.09 KG/M2 | WEIGHT: 217.2 LBS

## 2024-08-26 DIAGNOSIS — I10 PRIMARY HYPERTENSION: ICD-10-CM

## 2024-08-26 DIAGNOSIS — N18.30 STAGE 3 CHRONIC KIDNEY DISEASE, UNSPECIFIED WHETHER STAGE 3A OR 3B CKD: ICD-10-CM

## 2024-08-26 DIAGNOSIS — R29.898 WEAKNESS OF BOTH LOWER EXTREMITIES: ICD-10-CM

## 2024-08-26 DIAGNOSIS — I20.89 STABLE ANGINA: Primary | ICD-10-CM

## 2024-08-26 DIAGNOSIS — E11.9 TYPE 2 DIABETES MELLITUS WITHOUT COMPLICATION, WITHOUT LONG-TERM CURRENT USE OF INSULIN: ICD-10-CM

## 2024-08-26 DIAGNOSIS — Z12.5 PROSTATE CANCER SCREENING: ICD-10-CM

## 2024-08-26 DIAGNOSIS — N18.30 STAGE 3 CHRONIC KIDNEY DISEASE, UNSPECIFIED WHETHER STAGE 3A OR 3B CKD: Primary | ICD-10-CM

## 2024-08-26 LAB
BACTERIA UR QL AUTO: ABNORMAL /HPF
BILIRUB UR QL STRIP: NEGATIVE
CLARITY UR: CLEAR
COLOR UR: YELLOW
CREAT UR-MCNC: 64.8 MG/DL
DEPRECATED RDW RBC AUTO: 53.6 FL (ref 37–54)
ERYTHROCYTE [DISTWIDTH] IN BLOOD BY AUTOMATED COUNT: 14.4 % (ref 12.3–15.4)
GLUCOSE UR STRIP-MCNC: ABNORMAL MG/DL
HCT VFR BLD AUTO: 45.9 % (ref 37.5–51)
HGB BLD-MCNC: 14.9 G/DL (ref 13–17.7)
HGB UR QL STRIP.AUTO: ABNORMAL
KETONES UR QL STRIP: NEGATIVE
LEUKOCYTE ESTERASE UR QL STRIP.AUTO: NEGATIVE
MCH RBC QN AUTO: 31.9 PG (ref 26.6–33)
MCHC RBC AUTO-ENTMCNC: 32.5 G/DL (ref 31.5–35.7)
MCV RBC AUTO: 98.3 FL (ref 79–97)
NITRITE UR QL STRIP: NEGATIVE
PH UR STRIP.AUTO: 6 [PH] (ref 5–8)
PLATELET # BLD AUTO: 145 10*3/MM3 (ref 140–450)
PMV BLD AUTO: 11.3 FL (ref 6–12)
PROT ?TM UR-MCNC: 7.6 MG/DL
PROT UR QL STRIP: NEGATIVE
PROT/CREAT UR: 0.12 MG/G{CREAT}
RBC # BLD AUTO: 4.67 10*6/MM3 (ref 4.14–5.8)
RBC # UR STRIP: ABNORMAL /HPF
REF LAB TEST METHOD: ABNORMAL
SP GR UR STRIP: 1.02 (ref 1–1.03)
SQUAMOUS #/AREA URNS HPF: ABNORMAL /HPF
UROBILINOGEN UR QL STRIP: ABNORMAL
WBC # UR STRIP: ABNORMAL /HPF
WBC NRBC COR # BLD AUTO: 7.43 10*3/MM3 (ref 3.4–10.8)

## 2024-08-26 PROCEDURE — G2211 COMPLEX E/M VISIT ADD ON: HCPCS | Performed by: NURSE PRACTITIONER

## 2024-08-26 PROCEDURE — 1159F MED LIST DOCD IN RCRD: CPT | Performed by: NURSE PRACTITIONER

## 2024-08-26 PROCEDURE — 80061 LIPID PANEL: CPT

## 2024-08-26 PROCEDURE — 82570 ASSAY OF URINE CREATININE: CPT

## 2024-08-26 PROCEDURE — 83036 HEMOGLOBIN GLYCOSYLATED A1C: CPT

## 2024-08-26 PROCEDURE — 1160F RVW MEDS BY RX/DR IN RCRD: CPT | Performed by: NURSE PRACTITIONER

## 2024-08-26 PROCEDURE — 85027 COMPLETE CBC AUTOMATED: CPT

## 2024-08-26 PROCEDURE — 84100 ASSAY OF PHOSPHORUS: CPT

## 2024-08-26 PROCEDURE — 3079F DIAST BP 80-89 MM HG: CPT | Performed by: NURSE PRACTITIONER

## 2024-08-26 PROCEDURE — 3074F SYST BP LT 130 MM HG: CPT | Performed by: NURSE PRACTITIONER

## 2024-08-26 PROCEDURE — 1126F AMNT PAIN NOTED NONE PRSNT: CPT | Performed by: NURSE PRACTITIONER

## 2024-08-26 PROCEDURE — 81001 URINALYSIS AUTO W/SCOPE: CPT

## 2024-08-26 PROCEDURE — 84156 ASSAY OF PROTEIN URINE: CPT

## 2024-08-26 PROCEDURE — 83970 ASSAY OF PARATHORMONE: CPT

## 2024-08-26 PROCEDURE — 80053 COMPREHEN METABOLIC PANEL: CPT

## 2024-08-26 PROCEDURE — 99214 OFFICE O/P EST MOD 30 MIN: CPT | Performed by: NURSE PRACTITIONER

## 2024-08-26 PROCEDURE — 36415 COLL VENOUS BLD VENIPUNCTURE: CPT

## 2024-08-26 PROCEDURE — G0103 PSA SCREENING: HCPCS

## 2024-08-26 PROCEDURE — 3051F HG A1C>EQUAL 7.0%<8.0%: CPT | Performed by: NURSE PRACTITIONER

## 2024-08-26 NOTE — ASSESSMENT & PLAN NOTE
I am to assume care for pt's diabetes. He will have ha1c drawn prior to next visit. Continue current medications.

## 2024-08-26 NOTE — PROGRESS NOTES
"Chief Complaint  Chest Pain (Prolonged activity causes slight chest discomfort. Would like to discuss getting power chair)    Subjective          Lamin Martinez presents to [facility] to discuss getting a power chair to help with ADLs. Pt states he can go for just a few minutes before experiencing chest discomfort. He has not been to cardiology in just over a year. Pt states that pain subsides with 5-10 minutes of rest. He will also has lower extremity weakness after walking. He states that he has trouble going from one room to another to cook, clean, use the restroom, etc due to symptoms. He has tried to use manual wheelchair in the past and having to use arms causes the angina as well. A cane or walker have been ruled out due to the same reason.The use of a power wheelchair will significantly improve the patient's ability to participate in ADL's and the patient will use it on a regular basis with in the home as well as outside with family and necessary errands. Patient has the strength and mental capability to safely transfer in the wheelchair and maneuver it in their home and outside.     Pt is due for visit with [facility] for his DM. He is currently taking farxiga 10mg daily, glipizide 10mg BID, actos 30mg daily. Due for Ha1c. Pt states he is on a fixed income and has a hard time with travel expenses.       Objective   Vital Signs:   Vitals:    08/26/24 1015   BP: 128/80   BP Location: Left arm   Patient Position: Sitting   Cuff Size: Adult   Pulse: 76   Temp: 97.5 °F (36.4 °C)   TempSrc: Oral   SpO2: 97%   Weight: 98.5 kg (217 lb 3.2 oz)   Height: 170.2 cm (67.01\")       Body mass index is 34.01 kg/m².        Physical Exam  Vitals reviewed.   Constitutional:       General: He is not in acute distress.     Appearance: Normal appearance. He is well-developed.   HENT:      Head: Normocephalic and atraumatic.   Eyes:      Conjunctiva/sclera: Conjunctivae normal.      " Pupils: Pupils are equal, round, and reactive to light.   Cardiovascular:      Rate and Rhythm: Normal rate and regular rhythm.      Heart sounds: Normal heart sounds. No murmur heard.  Pulmonary:      Effort: Pulmonary effort is normal. No respiratory distress.      Breath sounds: Normal breath sounds.   Skin:     General: Skin is warm and dry.   Neurological:      Mental Status: He is alert and oriented to person, place, and time.   Psychiatric:         Mood and Affect: Mood and affect normal.         Behavior: Behavior normal.         Thought Content: Thought content normal.         Judgment: Judgment normal.            Lab Results   Component Value Date    GLUCOSE 149 (H) 08/26/2024    BUN 27 (H) 08/26/2024    CREATININE 1.68 (H) 08/26/2024    EGFR 42.1 (L) 08/26/2024    BCR 16.1 08/26/2024    K 4.3 08/26/2024    CO2 26.0 08/26/2024    CALCIUM 9.8 08/26/2024    ALBUMIN 4.3 08/26/2024    BILITOT 0.5 08/26/2024    AST 14 08/26/2024    ALT 17 08/26/2024     Lab Results   Component Value Date    HGBA1C 7.30 (H) 08/26/2024     Lab Results   Component Value Date    WBC 7.43 08/26/2024    HGB 14.9 08/26/2024    HCT 45.9 08/26/2024    MCV 98.3 (H) 08/26/2024     08/26/2024     Lab Results   Component Value Date    CHOL 119 08/26/2024    CHOL 109 05/01/2024    CHOL 124 12/08/2023     Lab Results   Component Value Date    TRIG 157 (H) 08/26/2024    TRIG 161 (H) 05/01/2024    TRIG 167 (H) 12/08/2023     Lab Results   Component Value Date    HDL 35 (L) 08/26/2024    HDL 33 (L) 05/01/2024    HDL 35 (L) 12/08/2023     Lab Results   Component Value Date    LDL 57 08/26/2024    LDL 49 05/01/2024    LDL 61 12/08/2023     Lab Results   Component Value Date    TSH 3.500 06/20/2023              Assessment and Plan    Assessment & Plan  Stable angina   Referral to cardiology. Pt to go to ED with prolonged pain or with any concerning symptoms. Power wheel chair ordered. See HPI.   Weakness of both lower extremities  Power  wheel chair ordered. See HPI.   Type 2 diabetes mellitus without complication, without long-term current use of insulin   I am to assume care for pt's diabetes. He will have ha1c drawn prior to next visit. Continue current medications.     Orders Placed This Encounter   Procedures    Hemoglobin A1c    Ambulatory Referral to Cardiology        Lab orders placed for next routine follow up.       Patient to notify office with any acute concerns or issues.  Patient verbalizes understanding, agrees with plan of care and has no further questions upon discharge.    Please note that portions of this note were completed with a voice recognition program.      Follow Up    Return for Next scheduled follow up.  Patient was given instructions and counseling regarding his condition or for health maintenance advice. Please see specific information pulled into the AVS if appropriate.     Medications Discontinued During This Encounter   Medication Reason    carvedilol (COREG) 25 MG tablet Historical Med - Therapy completed    tadalafil (CIALIS) 5 MG tablet Historical Med - Therapy completed

## 2024-08-27 ENCOUNTER — TELEPHONE (OUTPATIENT)
Dept: FAMILY MEDICINE CLINIC | Age: 74
End: 2024-08-27
Payer: MEDICARE

## 2024-08-27 LAB
ALBUMIN SERPL-MCNC: 4.3 G/DL (ref 3.5–5.2)
ALBUMIN/GLOB SERPL: 2 G/DL
ALP SERPL-CCNC: 86 U/L (ref 39–117)
ALT SERPL W P-5'-P-CCNC: 17 U/L (ref 1–41)
ANION GAP SERPL CALCULATED.3IONS-SCNC: 10 MMOL/L (ref 5–15)
AST SERPL-CCNC: 14 U/L (ref 1–40)
BILIRUB SERPL-MCNC: 0.5 MG/DL (ref 0–1.2)
BUN SERPL-MCNC: 27 MG/DL (ref 8–23)
BUN/CREAT SERPL: 16.1 (ref 7–25)
CALCIUM SPEC-SCNC: 9.8 MG/DL (ref 8.6–10.5)
CHLORIDE SERPL-SCNC: 105 MMOL/L (ref 98–107)
CHOLEST SERPL-MCNC: 119 MG/DL (ref 0–200)
CO2 SERPL-SCNC: 26 MMOL/L (ref 22–29)
CREAT SERPL-MCNC: 1.68 MG/DL (ref 0.76–1.27)
EGFRCR SERPLBLD CKD-EPI 2021: 42.1 ML/MIN/1.73
GLOBULIN UR ELPH-MCNC: 2.2 GM/DL
GLUCOSE SERPL-MCNC: 149 MG/DL (ref 65–99)
HBA1C MFR BLD: 7.3 % (ref 4.8–5.6)
HDLC SERPL-MCNC: 35 MG/DL (ref 40–60)
LDLC SERPL CALC-MCNC: 57 MG/DL (ref 0–100)
LDLC/HDLC SERPL: 1.5 {RATIO}
PHOSPHATE SERPL-MCNC: 3.5 MG/DL (ref 2.5–4.5)
POTASSIUM SERPL-SCNC: 4.3 MMOL/L (ref 3.5–5.2)
PROT SERPL-MCNC: 6.5 G/DL (ref 6–8.5)
PSA SERPL-MCNC: 3.1 NG/ML (ref 0–4)
PTH-INTACT SERPL-MCNC: 27.9 PG/ML (ref 15–65)
SODIUM SERPL-SCNC: 141 MMOL/L (ref 136–145)
TRIGL SERPL-MCNC: 157 MG/DL (ref 0–150)
VLDLC SERPL-MCNC: 27 MG/DL (ref 5–40)

## 2024-08-27 NOTE — TELEPHONE ENCOUNTER
"  Caller: Lamin Martinez \"Paul\"    Relationship: Self    Best call back number: [phone]    What was the call regarding: PATIENT IS RETURNING CALL.     "

## 2024-10-18 DIAGNOSIS — I25.119 CORONARY ARTERY DISEASE INVOLVING NATIVE HEART WITH ANGINA PECTORIS, UNSPECIFIED VESSEL OR LESION TYPE: ICD-10-CM

## 2024-10-18 RX ORDER — ISOSORBIDE MONONITRATE 30 MG/1
30 TABLET, EXTENDED RELEASE ORAL DAILY
Qty: 90 TABLET | Refills: 0 | Status: SHIPPED | OUTPATIENT
Start: 2024-10-18

## 2024-10-29 DIAGNOSIS — E78.2 MIXED HYPERLIPIDEMIA: ICD-10-CM

## 2024-10-29 RX ORDER — ATORVASTATIN CALCIUM 40 MG/1
40 TABLET, FILM COATED ORAL NIGHTLY
Qty: 90 TABLET | Refills: 0 | Status: SHIPPED | OUTPATIENT
Start: 2024-10-29

## 2024-11-11 DIAGNOSIS — E11.65 UNCONTROLLED TYPE 2 DIABETES MELLITUS WITH HYPERGLYCEMIA: Primary | ICD-10-CM

## 2024-11-11 RX ORDER — DAPAGLIFLOZIN 10 MG/1
10 TABLET, FILM COATED ORAL DAILY
Qty: 90 TABLET | Refills: 3 | Status: SHIPPED | OUTPATIENT
Start: 2024-11-11 | End: 2024-11-12 | Stop reason: SDUPTHER

## 2024-11-12 DIAGNOSIS — E11.65 UNCONTROLLED TYPE 2 DIABETES MELLITUS WITH HYPERGLYCEMIA: ICD-10-CM

## 2024-11-12 RX ORDER — DAPAGLIFLOZIN 10 MG/1
10 TABLET, FILM COATED ORAL DAILY
Qty: 90 TABLET | Refills: 3 | Status: SHIPPED | OUTPATIENT
Start: 2024-11-12 | End: 2024-11-13 | Stop reason: SDUPTHER

## 2024-11-13 DIAGNOSIS — E11.65 UNCONTROLLED TYPE 2 DIABETES MELLITUS WITH HYPERGLYCEMIA: ICD-10-CM

## 2024-11-13 RX ORDER — DAPAGLIFLOZIN 10 MG/1
10 TABLET, FILM COATED ORAL DAILY
Qty: 90 TABLET | Refills: 3 | Status: SHIPPED | OUTPATIENT
Start: 2024-11-13

## 2024-11-18 DIAGNOSIS — I10 PRIMARY HYPERTENSION: ICD-10-CM

## 2024-11-18 RX ORDER — LISINOPRIL 10 MG/1
10 TABLET ORAL DAILY
Qty: 90 TABLET | Refills: 0 | Status: SHIPPED | OUTPATIENT
Start: 2024-11-18

## 2024-12-17 ENCOUNTER — OFFICE VISIT (OUTPATIENT)
Dept: FAMILY MEDICINE CLINIC | Age: 74
End: 2024-12-17
Payer: MEDICARE

## 2024-12-17 VITALS
HEIGHT: 67 IN | OXYGEN SATURATION: 96 % | SYSTOLIC BLOOD PRESSURE: 161 MMHG | DIASTOLIC BLOOD PRESSURE: 65 MMHG | WEIGHT: 212 LBS | HEART RATE: 59 BPM | TEMPERATURE: 99.1 F | BODY MASS INDEX: 33.27 KG/M2

## 2024-12-17 DIAGNOSIS — I25.119 CORONARY ARTERY DISEASE INVOLVING NATIVE HEART WITH ANGINA PECTORIS, UNSPECIFIED VESSEL OR LESION TYPE: ICD-10-CM

## 2024-12-17 DIAGNOSIS — J06.9 ACUTE URI: Primary | ICD-10-CM

## 2024-12-17 DIAGNOSIS — Z00.00 MEDICARE ANNUAL WELLNESS VISIT, SUBSEQUENT: ICD-10-CM

## 2024-12-17 DIAGNOSIS — N40.1 BENIGN PROSTATIC HYPERPLASIA WITH LOWER URINARY TRACT SYMPTOMS, SYMPTOM DETAILS UNSPECIFIED: ICD-10-CM

## 2024-12-17 DIAGNOSIS — E78.2 MIXED HYPERLIPIDEMIA: ICD-10-CM

## 2024-12-17 DIAGNOSIS — E11.9 TYPE 2 DIABETES MELLITUS WITHOUT COMPLICATION, WITHOUT LONG-TERM CURRENT USE OF INSULIN: ICD-10-CM

## 2024-12-17 DIAGNOSIS — I10 PRIMARY HYPERTENSION: ICD-10-CM

## 2024-12-17 PROCEDURE — 1160F RVW MEDS BY RX/DR IN RCRD: CPT | Performed by: NURSE PRACTITIONER

## 2024-12-17 PROCEDURE — 1170F FXNL STATUS ASSESSED: CPT | Performed by: NURSE PRACTITIONER

## 2024-12-17 PROCEDURE — 3078F DIAST BP <80 MM HG: CPT | Performed by: NURSE PRACTITIONER

## 2024-12-17 PROCEDURE — G0439 PPPS, SUBSEQ VISIT: HCPCS | Performed by: NURSE PRACTITIONER

## 2024-12-17 PROCEDURE — 1125F AMNT PAIN NOTED PAIN PRSNT: CPT | Performed by: NURSE PRACTITIONER

## 2024-12-17 PROCEDURE — 1159F MED LIST DOCD IN RCRD: CPT | Performed by: NURSE PRACTITIONER

## 2024-12-17 PROCEDURE — 3077F SYST BP >= 140 MM HG: CPT | Performed by: NURSE PRACTITIONER

## 2024-12-17 PROCEDURE — 99214 OFFICE O/P EST MOD 30 MIN: CPT | Performed by: NURSE PRACTITIONER

## 2024-12-17 PROCEDURE — 3051F HG A1C>EQUAL 7.0%<8.0%: CPT | Performed by: NURSE PRACTITIONER

## 2024-12-17 RX ORDER — DOXYCYCLINE 100 MG/1
100 CAPSULE ORAL 2 TIMES DAILY
COMMUNITY
Start: 2024-12-14

## 2024-12-17 RX ORDER — BENZONATATE 200 MG/1
200 CAPSULE ORAL 3 TIMES DAILY PRN
COMMUNITY

## 2024-12-17 RX ORDER — ALBUTEROL SULFATE 90 UG/1
2 INHALANT RESPIRATORY (INHALATION) AS NEEDED
COMMUNITY
Start: 2024-12-14

## 2024-12-17 NOTE — Clinical Note
Have pt come in 2 weeks for BP check. He wasn't feeling good today, but I want to make sure it goes back to normal.

## 2024-12-17 NOTE — Clinical Note
"I just reviewed cardio's note and he was supposed to restart coreg. He did not report this during our visit. Can you go ahead and call pt to see if he is taking this.   \"Restore carvedilol. Advised him to start with half a pill twice a day for 7 days and then advance to a full pill twice a day constituting 25 mg twice daily.\""

## 2024-12-17 NOTE — PROGRESS NOTES
Subjective   The ABCs of the Annual Wellness Visit  Medicare Wellness Visit      Lamin Martinez is a 74 y.o. patient who presents for a Medicare Wellness Visit.    The following portions of the patient's history were reviewed and   updated as appropriate: allergies, current medications, past family history, past medical history, past social history, past surgical history, and problem list.    Compared to one year ago, the patient's physical   health is the same.  Compared to one year ago, the patient's mental   health is the same.    Recent Hospitalizations:  He was not admitted to the hospital during the last year.     Current Medical Providers:  Patient Care Team:  Therese Carreno APRN as PCP - General (Nurse Practitioner)  Michelle Walters APRN as Nurse Practitioner (Nurse Practitioner)  Marcel Villafana MD as Consulting Physician (Urology)    Outpatient Medications Prior to Visit   Medication Sig Dispense Refill    albuterol sulfate  (90 Base) MCG/ACT inhaler Inhale 2 puffs As Needed.      Ascorbic Acid 500 MG chewable tablet Chew 500 mg Daily.      aspirin 81 MG chewable tablet Chew 1 tablet Daily. Last dose 11-22-23      atorvastatin (LIPITOR) 40 MG tablet TAKE 1 TABLET BY MOUTH ONCE DAILY AT NIGHT 90 tablet 0    benzonatate (TESSALON) 200 MG capsule Take 1 capsule by mouth 3 (Three) Times a Day As Needed for Cough.      Blood Glucose Monitoring Suppl device Use as directed for blood glucose monitoring 1 each 0    clopidogrel (PLAVIX) 75 MG tablet Take 1 tablet by mouth once daily 90 tablet 1    doxycycline (MONODOX) 100 MG capsule Take 1 capsule by mouth 2 (Two) Times a Day.      Farxiga 10 MG tablet Take 10 mg by mouth Daily. 90 tablet 3    glipizide (GLUCOTROL XL) 10 MG 24 hr tablet Take 1 tablet by mouth 2 (Two) Times a Day for 180 days. 180 tablet 1    glucose blood test strip Test blood glucose 1 time each day 100 each 5    isosorbide mononitrate (IMDUR) 30 MG 24 hr tablet Take 1  "tablet by mouth once daily 90 tablet 0    Lancets misc Test blood glucose 1 time each day 100 each 5    lisinopril (PRINIVIL,ZESTRIL) 10 MG tablet Take 1 tablet by mouth once daily 90 tablet 0    multivitamin (THERAGRAN) tablet tablet Take 1 tablet by mouth Daily.      pioglitazone (ACTOS) 45 MG tablet Take 1 tablet by mouth Daily. 90 tablet 1    tamsulosin (FLOMAX) 0.4 MG capsule 24 hr capsule Take 1 capsule by mouth twice daily 180 capsule 1    vitamin D3 125 MCG (5000 UT) capsule capsule Take 1 capsule by mouth Daily. (Patient taking differently: Take 1 capsule by mouth As Needed. Takes 2 tabs on M, W, F) 90 capsule 1     No facility-administered medications prior to visit.     No opioid medication identified on active medication list. I have reviewed chart for other potential  high risk medication/s and harmful drug interactions in the elderly.      Aspirin is on active medication list. Aspirin use is indicated based on review of current medical condition/s. Pros and cons of this therapy have been discussed today. Benefits of this medication outweigh potential harm.  Patient has been encouraged to continue taking this medication.  .      Patient Active Problem List   Diagnosis    Type 2 diabetes mellitus without complication, without long-term current use of insulin    Mixed hyperlipidemia    Primary hypertension    Benign prostatic hyperplasia with lower urinary tract symptoms    Vitamin D deficiency    Carpal tunnel syndrome of left wrist     Advance Care Planning Advance Directive is on file.  ACP discussion was held with the patient during this visit. Patient has an advance directive in EMR which is still valid.             Objective   Vitals:    12/17/24 1604 12/17/24 1613 12/17/24 1648   BP:  157/74 161/65   BP Location:   Right arm   Patient Position:   Sitting   Pulse: 59 67 59   Temp:  99.1 °F (37.3 °C)    TempSrc:  Oral    SpO2: 96%     Weight: 96.2 kg (212 lb)     Height: 170.2 cm (67.01\")     PainSc: " "  1         Estimated body mass index is 33.2 kg/m² as calculated from the following:    Height as of this encounter: 170.2 cm (67.01\").    Weight as of this encounter: 96.2 kg (212 lb).    BMI is >= 30 and <35. (Class 1 Obesity). The following options were offered after discussion;: exercise counseling/recommendations and nutrition counseling/recommendations           Does the patient have evidence of cognitive impairment? No                                                                                                Health  Risk Assessment    Smoking Status:  Social History     Tobacco Use   Smoking Status Former    Current packs/day: 0.00    Average packs/day: 2.0 packs/day for 3.0 years (6.0 ttl pk-yrs)    Types: Cigarettes    Start date: 2019    Quit date: 2022    Years since quittin.9    Passive exposure: Past   Smokeless Tobacco Never     Alcohol Consumption:  Social History     Substance and Sexual Activity   Alcohol Use Yes    Comment: occasionally       Fall Risk Screen  STEADI Fall Risk Assessment was completed, and patient is at LOW risk for falls.Assessment completed on:2024    Depression Screening   Little interest or pleasure in doing things? Not at all   Feeling down, depressed, or hopeless? Not at all   PHQ-2 Total Score 0      Health Habits and Functional and Cognitive Screenin/17/2024     4:09 PM   Functional & Cognitive Status   Do you have difficulty preparing food and eating? No   Do you have difficulty bathing yourself, getting dressed or grooming yourself? No   Do you have difficulty using the toilet? No   Do you have difficulty moving around from place to place? No   Do you have trouble with steps or getting out of a bed or a chair? No   Current Diet Well Balanced Diet   Dental Exam Up to date   Eye Exam Not up to date   Exercise (times per week) 2 times per week   Current Exercises Include Walking   Do you need help using the phone?  No   Are you deaf or do you " have serious difficulty hearing?  No   Do you need help to go to places out of walking distance? No   Do you need help shopping? No   Do you need help preparing meals?  No   Do you need help with housework?  No   Do you need help with laundry? No   Do you need help taking your medications? No   Do you need help managing money? No   Do you ever drive or ride in a car without wearing a seat belt? No   Have you felt unusual stress, anger or loneliness in the last month? No   Who do you live with? Alone   If you need help, do you have trouble finding someone available to you? No   Have you been bothered in the last four weeks by sexual problems? No   Do you have difficulty concentrating, remembering or making decisions? No           Age-appropriate Screening Schedule:  Refer to the list below for future screening recommendations based on patient's age, sex and/or medical conditions. Orders for these recommended tests are listed in the plan section. The patient has been provided with a written plan.    Health Maintenance List  Health Maintenance   Topic Date Due    DIABETIC FOOT EXAM  Never done    BMI FOLLOWUP  05/25/2024    HEMOGLOBIN A1C  02/26/2025    ZOSTER VACCINE (1 of 2) 12/17/2024 (Originally 8/13/1999)    COVID-19 Vaccine (4 - 2024-25 season) 12/19/2024 (Originally 9/1/2024)    DIABETIC EYE EXAM  02/04/2025 (Originally 8/13/1960)    INFLUENZA VACCINE  03/31/2025 (Originally 7/1/2024)    RSV Vaccine - Adults (1 - 1-dose 75+ series) 04/11/2025 (Originally 8/13/2024)    Pneumococcal Vaccine 65+ (1 of 2 - PCV) 12/17/2025 (Originally 8/13/1956)    TDAP/TD VACCINES (1 - Tdap) 12/17/2025 (Originally [birth year]/1968)    COLORECTAL CANCER SCREENING  10/05/2026 (Originally 1950)    LIPID PANEL  08/26/2025    ANNUAL WELLNESS VISIT  12/17/2025    HEPATITIS C SCREENING  Completed    AAA SCREEN (ONE-TIME)  Completed    URINE MICROALBUMIN  Discontinued                                                                              "                                                                   CMS Preventative Services Quick Reference  Risk Factors Identified During Encounter  Immunizations Discussed/Encouraged: Tdap, Influenza, Prevnar 20 (Pneumococcal 20-valent conjugate), Shingrix, COVID19, and RSV (Respiratory Syncytial Virus)  Dental Screening Recommended  Vision Screening Recommended    The above risks/problems have been discussed with the patient.  Pertinent information has been shared with the patient in the After Visit Summary.  An After Visit Summary and PPPS were made available to the patient.    Follow Up:   Next Medicare Wellness visit to be scheduled in 1 year.         Additional E&M Note during same encounter follows:  Patient has additional, significant, and separately identifiable condition(s)/problem(s) that require work above and beyond the Medicare Wellness Visit     Chief Complaint  Medicare Wellness-subsequent and Pneumonia    Subjective   Pneumonia    Pt is here for routine visit as well.     He sees cardio, Dr. Bloom, for CAD, HTN, HLD, angina. Has had 5 cardiac stents placed. He is on asa 81mg daily, atorvastatin 40mg daily, plavix 75mg daily, imdur 30mg daily, lisinopril 10mg daily.     Patient also sees [facility] kidney specialist every year.      Pt is taking actos 45mg daily, glipizide 10mg BID and farxiga 10mg daily for DM. Due for blood work.     He is taking Flomax BID for bph. He has been to urologist once and follow-up was not needed as symptoms are controlled.     Pt was seen this past Saturday at urgent care. He was rx doxycycline, tessalon perles, albuterol and mucinex. States he is feeling better. No longer soa or wheezing.     He does report that he got the power wheelchair and it has been very helpful.                       Objective   Vital Signs:  /65 (BP Location: Right arm, Patient Position: Sitting)   Pulse 59   Temp 99.1 °F (37.3 °C) (Oral)   Ht 170.2 cm (67.01\")   Wt 96.2 kg (212 " lb)   SpO2 96%   BMI 33.20 kg/m²   Physical Exam  Vitals reviewed.   Constitutional:       General: He is not in acute distress.     Appearance: Normal appearance. He is well-developed.   HENT:      Head: Normocephalic and atraumatic.   Eyes:      Conjunctiva/sclera: Conjunctivae normal.      Pupils: Pupils are equal, round, and reactive to light.   Cardiovascular:      Rate and Rhythm: Normal rate and regular rhythm.      Heart sounds: Normal heart sounds. No murmur heard.  Pulmonary:      Effort: Pulmonary effort is normal. No respiratory distress.      Breath sounds: Normal breath sounds.   Skin:     General: Skin is warm and dry.   Neurological:      Mental Status: He is alert and oriented to person, place, and time.   Psychiatric:         Mood and Affect: Mood and affect normal.         Behavior: Behavior normal.         Thought Content: Thought content normal.         Judgment: Judgment normal.                       Assessment and Plan            Medicare annual wellness visit, subsequent         Acute URI  Continue doxycycline, tesalon perles, albuterol and mucinex. Notify office if symptoms begin to worsen. Go to ED with severe soa.        Type 2 diabetes mellitus without complication, without long-term current use of insulin   Will notify patient of results and treat accordingly.   Continue actos 45mg daily, glipizide 10mg BID, farxiga 10mg daily and diabetic diet. Will adjust meds if needed.     Orders:    Comprehensive Metabolic Panel; Future    Lipid Panel; Future    Hemoglobin A1c; Future    Comprehensive Metabolic Panel; Future    Lipid Panel; Future    Hemoglobin A1c; Future    Mixed hyperlipidemia     Continue atorvastatin 40mg daily. Heart healthy diet.        Primary hypertension    Usually wnl. Continue lisinopril 10mg daily. Pt to come in 2 weeks for BP check.     Message sent to staff:  I just reviewed cardio's note and he was supposed to restart coreg. He did not report this during our visit.  "Can you go ahead and call pt to see if he is taking this.     \"Restore carvedilol. Advised him to start with half a pill twice a day for 7 days and then advance to a full pill twice a day constituting 25 mg twice daily\"    This may be the reason for elevation as well.        Coronary artery disease involving native heart with angina pectoris, unspecified vessel or lesion type    Continue plavix 75mg and imdur 30mg daily.        Benign prostatic hyperplasia with lower urinary tract symptoms, symptom details unspecified  Improved. Continue flomax 0.4mg BID.         Lab orders placed for next routine follow up.     Patient to notify office with any acute concerns or issues.  Patient verbalizes understanding, agrees with plan of care and has no further questions upon discharge.    Please note that portions of this note were completed with a voice recognition program.         Follow Up   Return in about 3 months (around 3/17/2025) for Recheck.  Patient was given instructions and counseling regarding his condition or for health maintenance advice. Please see specific information pulled into the AVS if appropriate.    "

## 2024-12-17 NOTE — ASSESSMENT & PLAN NOTE
Will notify patient of results and treat accordingly.   Continue actos 45mg daily, glipizide 10mg BID, farxiga 10mg daily and diabetic diet. Will adjust meds if needed.     Orders:    Comprehensive Metabolic Panel; Future    Lipid Panel; Future    Hemoglobin A1c; Future    Comprehensive Metabolic Panel; Future    Lipid Panel; Future    Hemoglobin A1c; Future

## 2024-12-17 NOTE — ASSESSMENT & PLAN NOTE
"  Usually wnl. Continue lisinopril 10mg daily. Pt to come in 2 weeks for BP check.     Message sent to staff:  I just reviewed cardio's note and he was supposed to restart coreg. He did not report this during our visit. Can you go ahead and call pt to see if he is taking this.     \"Restore carvedilol. Advised him to start with half a pill twice a day for 7 days and then advance to a full pill twice a day constituting 25 mg twice daily\"    This may be the reason for elevation as well.        "

## 2024-12-23 DIAGNOSIS — I25.119 CORONARY ARTERY DISEASE INVOLVING NATIVE HEART WITH ANGINA PECTORIS, UNSPECIFIED VESSEL OR LESION TYPE: ICD-10-CM

## 2024-12-23 DIAGNOSIS — N40.1 BENIGN PROSTATIC HYPERPLASIA WITH LOWER URINARY TRACT SYMPTOMS, SYMPTOM DETAILS UNSPECIFIED: ICD-10-CM

## 2024-12-23 RX ORDER — CLOPIDOGREL BISULFATE 75 MG/1
75 TABLET ORAL DAILY
Qty: 90 TABLET | Refills: 0 | Status: SHIPPED | OUTPATIENT
Start: 2024-12-23

## 2024-12-23 RX ORDER — TAMSULOSIN HYDROCHLORIDE 0.4 MG/1
1 CAPSULE ORAL 2 TIMES DAILY
Qty: 180 CAPSULE | Refills: 0 | Status: SHIPPED | OUTPATIENT
Start: 2024-12-23

## 2024-12-27 ENCOUNTER — TELEPHONE (OUTPATIENT)
Dept: FAMILY MEDICINE CLINIC | Age: 74
End: 2024-12-27
Payer: MEDICARE

## 2024-12-27 NOTE — TELEPHONE ENCOUNTER
"----- Message from Therese Carreno sent at 12/17/2024  5:46 PM EST -----  I just reviewed cardio's note and he was supposed to restart coreg. He did not report this during our visit. Can you go ahead and call pt to see if he is taking this.     \"Restore carvedilol. Advised him to start with half a pill twice a day for 7 days and then advance to a full pill twice a day constituting 25 mg twice daily.\"  "

## 2024-12-27 NOTE — TELEPHONE ENCOUNTER
Spoke to Patient and he states he does not think he started that back.  He will check his pill bottles when he gets back home and call me back.  He will also check his BP and let me know what that reading is as well.

## 2024-12-31 RX ORDER — CARVEDILOL 25 MG/1
25 TABLET ORAL 2 TIMES DAILY WITH MEALS
COMMUNITY

## 2024-12-31 NOTE — TELEPHONE ENCOUNTER
Patient states he did start taking it when Cardio gave it to him.  His BP cuff's batteries are dead.  He will get more batteries or stop by the office and have his BP checked.

## 2025-01-08 ENCOUNTER — TELEPHONE (OUTPATIENT)
Dept: FAMILY MEDICINE CLINIC | Age: 75
End: 2025-01-08
Payer: MEDICARE

## 2025-01-09 ENCOUNTER — CLINICAL SUPPORT (OUTPATIENT)
Dept: FAMILY MEDICINE CLINIC | Age: 75
End: 2025-01-09
Payer: MEDICARE

## 2025-01-09 VITALS — HEART RATE: 68 BPM | SYSTOLIC BLOOD PRESSURE: 119 MMHG | DIASTOLIC BLOOD PRESSURE: 66 MMHG

## 2025-01-14 DIAGNOSIS — I25.119 CORONARY ARTERY DISEASE INVOLVING NATIVE HEART WITH ANGINA PECTORIS, UNSPECIFIED VESSEL OR LESION TYPE: ICD-10-CM

## 2025-01-14 RX ORDER — ISOSORBIDE MONONITRATE 30 MG/1
30 TABLET, EXTENDED RELEASE ORAL DAILY
Qty: 90 TABLET | Refills: 0 | Status: SHIPPED | OUTPATIENT
Start: 2025-01-14

## 2025-01-20 ENCOUNTER — LAB (OUTPATIENT)
Dept: LAB | Facility: HOSPITAL | Age: 75
End: 2025-01-20
Payer: MEDICARE

## 2025-01-20 DIAGNOSIS — E11.9 TYPE 2 DIABETES MELLITUS WITHOUT COMPLICATION, WITHOUT LONG-TERM CURRENT USE OF INSULIN: ICD-10-CM

## 2025-01-20 LAB
ALBUMIN SERPL-MCNC: 3.6 G/DL (ref 3.5–5.2)
ALBUMIN/GLOB SERPL: 1.1 G/DL
ALP SERPL-CCNC: 70 U/L (ref 39–117)
ALT SERPL W P-5'-P-CCNC: 16 U/L (ref 1–41)
ANION GAP SERPL CALCULATED.3IONS-SCNC: 10.5 MMOL/L (ref 5–15)
AST SERPL-CCNC: 18 U/L (ref 1–40)
BILIRUB SERPL-MCNC: 0.4 MG/DL (ref 0–1.2)
BUN SERPL-MCNC: 27 MG/DL (ref 8–23)
BUN/CREAT SERPL: 12.9 (ref 7–25)
CALCIUM SPEC-SCNC: 9.1 MG/DL (ref 8.6–10.5)
CHLORIDE SERPL-SCNC: 105 MMOL/L (ref 98–107)
CHOLEST SERPL-MCNC: 136 MG/DL (ref 0–200)
CO2 SERPL-SCNC: 22.5 MMOL/L (ref 22–29)
CREAT SERPL-MCNC: 2.1 MG/DL (ref 0.76–1.27)
EGFRCR SERPLBLD CKD-EPI 2021: 32.2 ML/MIN/1.73
GLOBULIN UR ELPH-MCNC: 3.3 GM/DL
GLUCOSE SERPL-MCNC: 174 MG/DL (ref 65–99)
HBA1C MFR BLD: 6.6 % (ref 4.8–5.6)
HDLC SERPL-MCNC: 33 MG/DL (ref 40–60)
LDLC SERPL CALC-MCNC: 75 MG/DL (ref 0–100)
LDLC/HDLC SERPL: 2.15 {RATIO}
POTASSIUM SERPL-SCNC: 4.5 MMOL/L (ref 3.5–5.2)
PROT SERPL-MCNC: 6.9 G/DL (ref 6–8.5)
SODIUM SERPL-SCNC: 138 MMOL/L (ref 136–145)
TRIGL SERPL-MCNC: 161 MG/DL (ref 0–150)
VLDLC SERPL-MCNC: 28 MG/DL (ref 5–40)

## 2025-01-20 PROCEDURE — 36415 COLL VENOUS BLD VENIPUNCTURE: CPT

## 2025-01-20 PROCEDURE — 80053 COMPREHEN METABOLIC PANEL: CPT

## 2025-01-20 PROCEDURE — 80061 LIPID PANEL: CPT

## 2025-01-20 PROCEDURE — 83036 HEMOGLOBIN GLYCOSYLATED A1C: CPT

## 2025-01-21 ENCOUNTER — LAB (OUTPATIENT)
Dept: LAB | Facility: HOSPITAL | Age: 75
End: 2025-01-21
Payer: MEDICARE

## 2025-01-21 DIAGNOSIS — N18.4 CHRONIC KIDNEY DISEASE, STAGE IV (SEVERE): Primary | ICD-10-CM

## 2025-01-21 DIAGNOSIS — E11.9 TYPE 2 DIABETES MELLITUS WITHOUT COMPLICATION, WITHOUT LONG-TERM CURRENT USE OF INSULIN: ICD-10-CM

## 2025-01-21 DIAGNOSIS — N18.4 CHRONIC KIDNEY DISEASE, STAGE IV (SEVERE): ICD-10-CM

## 2025-01-21 LAB
ANION GAP SERPL CALCULATED.3IONS-SCNC: 8.7 MMOL/L (ref 5–15)
BUN SERPL-MCNC: 26 MG/DL (ref 8–23)
BUN/CREAT SERPL: 13.3 (ref 7–25)
CALCIUM SPEC-SCNC: 9.1 MG/DL (ref 8.6–10.5)
CHLORIDE SERPL-SCNC: 104 MMOL/L (ref 98–107)
CO2 SERPL-SCNC: 27.3 MMOL/L (ref 22–29)
CREAT SERPL-MCNC: 1.95 MG/DL (ref 0.76–1.27)
EGFRCR SERPLBLD CKD-EPI 2021: 35.2 ML/MIN/1.73
GLUCOSE SERPL-MCNC: 122 MG/DL (ref 65–99)
POTASSIUM SERPL-SCNC: 4.5 MMOL/L (ref 3.5–5.2)
SODIUM SERPL-SCNC: 140 MMOL/L (ref 136–145)

## 2025-01-21 PROCEDURE — 80048 BASIC METABOLIC PNL TOTAL CA: CPT

## 2025-01-21 PROCEDURE — 36415 COLL VENOUS BLD VENIPUNCTURE: CPT

## 2025-02-18 DIAGNOSIS — I10 PRIMARY HYPERTENSION: ICD-10-CM

## 2025-02-18 DIAGNOSIS — E78.2 MIXED HYPERLIPIDEMIA: ICD-10-CM

## 2025-02-18 DIAGNOSIS — E11.9 TYPE 2 DIABETES MELLITUS WITH HEMOGLOBIN A1C GOAL OF LESS THAN 7.0%: ICD-10-CM

## 2025-02-19 RX ORDER — GLIPIZIDE 10 MG/1
10 TABLET, FILM COATED, EXTENDED RELEASE ORAL 2 TIMES DAILY
Qty: 180 TABLET | Refills: 0 | OUTPATIENT
Start: 2025-02-19

## 2025-02-19 RX ORDER — LISINOPRIL 10 MG/1
10 TABLET ORAL DAILY
Qty: 90 TABLET | Refills: 0 | Status: SHIPPED | OUTPATIENT
Start: 2025-02-19

## 2025-02-19 RX ORDER — ATORVASTATIN CALCIUM 40 MG/1
40 TABLET, FILM COATED ORAL NIGHTLY
Qty: 90 TABLET | Refills: 0 | Status: SHIPPED | OUTPATIENT
Start: 2025-02-19

## 2025-02-19 NOTE — TELEPHONE ENCOUNTER
Last OV 04/12/2024    Next scheduled appt. NONE    Cancelled last appt scheduled on 07/16/2024    Patient notified thru my chart needs to call and make appt

## 2025-03-11 ENCOUNTER — OFFICE VISIT (OUTPATIENT)
Dept: FAMILY MEDICINE CLINIC | Age: 75
End: 2025-03-11
Payer: MEDICARE

## 2025-03-11 VITALS
WEIGHT: 208 LBS | OXYGEN SATURATION: 95 % | TEMPERATURE: 98.6 F | SYSTOLIC BLOOD PRESSURE: 151 MMHG | HEART RATE: 54 BPM | BODY MASS INDEX: 32.65 KG/M2 | HEIGHT: 67 IN | DIASTOLIC BLOOD PRESSURE: 68 MMHG

## 2025-03-11 DIAGNOSIS — Z12.9 CANCER SCREENING: ICD-10-CM

## 2025-03-11 DIAGNOSIS — I25.119 CORONARY ARTERY DISEASE INVOLVING NATIVE HEART WITH ANGINA PECTORIS, UNSPECIFIED VESSEL OR LESION TYPE: ICD-10-CM

## 2025-03-11 DIAGNOSIS — L85.3 DRY SKIN: ICD-10-CM

## 2025-03-11 DIAGNOSIS — N18.32 STAGE 3B CHRONIC KIDNEY DISEASE: ICD-10-CM

## 2025-03-11 DIAGNOSIS — E78.2 MIXED HYPERLIPIDEMIA: ICD-10-CM

## 2025-03-11 DIAGNOSIS — N40.1 BENIGN PROSTATIC HYPERPLASIA WITH LOWER URINARY TRACT SYMPTOMS, SYMPTOM DETAILS UNSPECIFIED: ICD-10-CM

## 2025-03-11 DIAGNOSIS — I10 PRIMARY HYPERTENSION: ICD-10-CM

## 2025-03-11 DIAGNOSIS — E11.9 TYPE 2 DIABETES MELLITUS WITHOUT COMPLICATION, WITHOUT LONG-TERM CURRENT USE OF INSULIN: Primary | ICD-10-CM

## 2025-03-11 RX ORDER — PIOGLITAZONE 45 MG/1
45 TABLET ORAL DAILY
Qty: 90 TABLET | Refills: 1 | Status: SHIPPED | OUTPATIENT
Start: 2025-03-11

## 2025-03-11 RX ORDER — GLIPIZIDE 10 MG/1
10 TABLET, FILM COATED, EXTENDED RELEASE ORAL 2 TIMES DAILY
Qty: 180 TABLET | Refills: 1 | Status: SHIPPED | OUTPATIENT
Start: 2025-03-11 | End: 2025-09-07

## 2025-03-11 RX ORDER — VIT E ACET/GLY/DIMETH/WATER
LOTION (ML) TOPICAL 2 TIMES DAILY
Qty: 473 ML | Refills: 1 | Status: SHIPPED | OUTPATIENT
Start: 2025-03-11

## 2025-03-11 NOTE — ASSESSMENT & PLAN NOTE
restarting glipizide 10 mg twice daily.  Continue Actos 45 mg and Farxiga 10 mg daily.  Diabetic diet.  Will draw labs for next visit.  Orders:    glipizide (GLUCOTROL XL) 10 MG 24 hr tablet; Take 1 tablet by mouth 2 (Two) Times a Day for 180 days.    pioglitazone (ACTOS) 45 MG tablet; Take 1 tablet by mouth Daily.    Comprehensive Metabolic Panel; Future    Lipid Panel; Future    Hemoglobin A1c; Future    Microalbumin / Creatinine Urine Ratio - Urine, Clean Catch; Future

## 2025-03-11 NOTE — ASSESSMENT & PLAN NOTE
Stable on Flomax 0.4 mg twice daily.  Continue to monitor and follow-up with urology as scheduled..

## 2025-03-11 NOTE — ASSESSMENT & PLAN NOTE
continue aspirin 81 mg daily, Plavix 75 mg daily and Imdur 30 mg daily.  Follow-up with cardiology as scheduled.  Go to ER with any chest pains, palpitations, shortness of air, etc.

## 2025-03-11 NOTE — ASSESSMENT & PLAN NOTE
Cetaphil twice daily.  Referral to Derm.  Orders:    cetaphil (CETAPHIL) lotion; Apply  topically to the appropriate area as directed 2 (Two) Times a Day.    Ambulatory Referral to Dermatology

## 2025-03-11 NOTE — ASSESSMENT & PLAN NOTE
Stable.  Continue lisinopril 10 mg daily and Coreg 25 mg twice daily.  Low-sodium diet.  Routine exercise and weight loss. Monitor BP at home. Goal less than 130/80. Continue follow-up with cardiology.

## 2025-03-11 NOTE — ASSESSMENT & PLAN NOTE
Stable on atorvastatin 40 mg daily.  Heart healthy diet. Routine exercise and weight loss. Continue to monitor.

## 2025-03-11 NOTE — PROGRESS NOTES
"Chief Complaint  Diabetes (3 month f/u)    Subjective          [facility] Dr. Bloom, for CAD, HTN, HLD, angina. Has had 5 cardiac stents placed. He is on asa 81mg daily, atorvastatin 40mg daily, plavix 75mg daily, imdur 30mg daily, lisinopril 10mg daily.      Patient also sees [facility] kidney specialist every year.       Pt is taking actos 45mg daily, glipizide 10mg BID and farxiga 10mg daily for DM. Due for blood work.      He is taking Flomax BID for bph. He has been to urologist once and follow-up was not needed as symptoms are controlled.          Objective   Vital Signs:   Vitals:    03/11/25 1244 03/11/25 1339 03/11/25 1340   BP: 145/73 165/85 151/68   Pulse: 57 54    Temp: 98.6 °F (37 °C)     TempSrc: Oral     SpO2: 95%     Weight: 94.3 kg (208 lb)     Height: 170.2 cm (67.01\")         Body mass index is 32.57 kg/m².         Physical Exam       Lab Results   Component Value Date    GLUCOSE 122 (H) 01/21/2025    BUN 26 (H) 01/21/2025    CREATININE 1.95 (H) 01/21/2025    EGFR 35.2 (L) 01/21/2025    BCR 13.3 01/21/2025    K 4.5 01/21/2025    CO2 27.3 01/21/2025    CALCIUM 9.1 01/21/2025    ALBUMIN 3.6 01/20/2025    BILITOT 0.4 01/20/2025    AST 18 01/20/2025    ALT 16 01/20/2025     Lab Results   Component Value Date    HGBA1C 6.60 (H) 01/20/2025     Lab Results   Component Value Date    WBC 7.43 08/26/2024    HGB 14.9 08/26/2024    HCT 45.9 08/26/2024    MCV 98.3 (H) 08/26/2024     08/26/2024     Lab Results   Component Value Date    CHOL 136 01/20/2025    CHOL 119 08/26/2024    CHOL 109 05/01/2024     Lab Results   Component Value Date    TRIG 161 (H) 01/20/2025    TRIG 157 (H) 08/26/2024    TRIG 161 (H) 05/01/2024     Lab Results   Component Value Date    HDL 33 (L) 01/20/2025    HDL 35 (L) 08/26/2024    HDL 33 (L) 05/01/2024     Lab Results   Component Value Date    LDL 75 01/20/2025    LDL 57 08/26/2024    LDL 49 " 05/01/2024     Lab Results   Component Value Date    TSH 3.500 06/20/2023     Labs reviewed with patient during appointment.                 Assessment and Plan    Assessment & Plan  Type 2 diabetes mellitus without complication, without long-term current use of insulin   restarting glipizide 10 mg twice daily.  Continue Actos 45 mg and Farxiga 10 mg daily.  Diabetic diet.  Will draw labs for next visit.  Orders:    glipizide (GLUCOTROL XL) 10 MG 24 hr tablet; Take 1 tablet by mouth 2 (Two) Times a Day for 180 days.    pioglitazone (ACTOS) 45 MG tablet; Take 1 tablet by mouth Daily.    Comprehensive Metabolic Panel; Future    Lipid Panel; Future    Hemoglobin A1c; Future    Microalbumin / Creatinine Urine Ratio - Urine, Clean Catch; Future    Primary hypertension  Stable.  Continue lisinopril 10 mg daily and Coreg 25 mg twice daily.  Low-sodium diet.  Routine exercise and weight loss. Monitor BP at home. Goal less than 130/80. Continue follow-up with cardiology.         Mixed hyperlipidemia   Stable on atorvastatin 40 mg daily.  Heart healthy diet. Routine exercise and weight loss. Continue to monitor.         Stage 3b chronic kidney disease   follow-up with nephrology as scheduled.  Avoid NSAIDs.         Dry skin  Cetaphil twice daily.  Referral to Derm.  Orders:    cetaphil (CETAPHIL) lotion; Apply  topically to the appropriate area as directed 2 (Two) Times a Day.    Ambulatory Referral to Dermatology    Cancer screening  Referral initiated.  Orders:    Ambulatory Referral to Dermatology    Benign prostatic hyperplasia with lower urinary tract symptoms, symptom details unspecified  Stable on Flomax 0.4 mg twice daily.  Continue to monitor and follow-up with urology as scheduled..        Coronary artery disease involving native heart with angina pectoris, unspecified vessel or lesion type   continue aspirin 81 mg daily, Plavix 75 mg daily and Imdur 30 mg daily.  Follow-up with cardiology as scheduled.  Go to ER  with any chest pains, palpitations, shortness of air, etc.              Lab orders placed for next routine follow up.      Patient to notify office with any acute concerns or issues.  Patient verbalizes understanding, agrees with plan of care and has no further questions upon discharge.    Please note that portions of this note were completed with a voice recognition program.      Follow Up    Return in about 3 months (around 6/11/2025) for Recheck.  Patient was given instructions and counseling regarding his condition or for health maintenance advice. Please see specific information pulled into the AVS if appropriate.       Current Outpatient Medications:     Ascorbic Acid 500 MG chewable tablet, Chew 500 mg Daily., Disp: , Rfl:     aspirin 81 MG chewable tablet, Chew 1 tablet Daily. Last dose 11-22-23, Disp: , Rfl:     atorvastatin (LIPITOR) 40 MG tablet, TAKE 1 TABLET BY MOUTH ONCE DAILY AT NIGHT, Disp: 90 tablet, Rfl: 0    Blood Glucose Monitoring Suppl device, Use as directed for blood glucose monitoring, Disp: 1 each, Rfl: 0    carvedilol (COREG) 25 MG tablet, Take 1 tablet by mouth 2 (Two) Times a Day With Meals., Disp: , Rfl:     clopidogrel (PLAVIX) 75 MG tablet, Take 1 tablet by mouth once daily, Disp: 90 tablet, Rfl: 0    Farxiga 10 MG tablet, Take 10 mg by mouth Daily., Disp: 90 tablet, Rfl: 3    glipizide (GLUCOTROL XL) 10 MG 24 hr tablet, Take 1 tablet by mouth 2 (Two) Times a Day for 180 days., Disp: 180 tablet, Rfl: 1    glucose blood test strip, Test blood glucose 1 time each day, Disp: 100 each, Rfl: 5    isosorbide mononitrate (IMDUR) 30 MG 24 hr tablet, Take 1 tablet by mouth once daily, Disp: 90 tablet, Rfl: 0    Lancets misc, Test blood glucose 1 time each day, Disp: 100 each, Rfl: 5    lisinopril (PRINIVIL,ZESTRIL) 10 MG tablet, Take 1 tablet by mouth once daily, Disp: 90 tablet, Rfl: 0    multivitamin (THERAGRAN) tablet tablet, Take 1 tablet by mouth Daily., Disp: , Rfl:     pioglitazone  (ACTOS) 45 MG tablet, Take 1 tablet by mouth Daily., Disp: 90 tablet, Rfl: 1    tamsulosin (FLOMAX) 0.4 MG capsule 24 hr capsule, Take 1 capsule by mouth twice daily, Disp: 180 capsule, Rfl: 0    vitamin D3 125 MCG (5000 UT) capsule capsule, Take 1 capsule by mouth Daily. (Patient taking differently: Take 1 capsule by mouth As Needed. Takes 2 tabs on M, W, F), Disp: 90 capsule, Rfl: 1    cetaphil (CETAPHIL) lotion, Apply  topically to the appropriate area as directed 2 (Two) Times a Day., Disp: 473 mL, Rfl: 1      Medications Discontinued During This Encounter   Medication Reason    doxycycline (MONODOX) 100 MG capsule Historical Med - Therapy completed    albuterol sulfate  (90 Base) MCG/ACT inhaler Historical Med - Therapy completed    benzonatate (TESSALON) 200 MG capsule Historical Med - Therapy completed    glipizide (GLUCOTROL XL) 10 MG 24 hr tablet Reorder    pioglitazone (ACTOS) 45 MG tablet Reorder

## 2025-03-26 DIAGNOSIS — N40.1 BENIGN PROSTATIC HYPERPLASIA WITH LOWER URINARY TRACT SYMPTOMS, SYMPTOM DETAILS UNSPECIFIED: ICD-10-CM

## 2025-03-26 DIAGNOSIS — I25.119 CORONARY ARTERY DISEASE INVOLVING NATIVE HEART WITH ANGINA PECTORIS, UNSPECIFIED VESSEL OR LESION TYPE: ICD-10-CM

## 2025-03-26 RX ORDER — CLOPIDOGREL BISULFATE 75 MG/1
75 TABLET ORAL DAILY
Qty: 90 TABLET | Refills: 0 | Status: SHIPPED | OUTPATIENT
Start: 2025-03-26

## 2025-03-26 RX ORDER — TAMSULOSIN HYDROCHLORIDE 0.4 MG/1
1 CAPSULE ORAL 2 TIMES DAILY
Qty: 180 CAPSULE | Refills: 0 | Status: SHIPPED | OUTPATIENT
Start: 2025-03-26

## 2025-04-12 DIAGNOSIS — I25.119 CORONARY ARTERY DISEASE INVOLVING NATIVE HEART WITH ANGINA PECTORIS, UNSPECIFIED VESSEL OR LESION TYPE: ICD-10-CM

## 2025-04-14 RX ORDER — ISOSORBIDE MONONITRATE 30 MG/1
30 TABLET, EXTENDED RELEASE ORAL DAILY
Qty: 90 TABLET | Refills: 0 | Status: SHIPPED | OUTPATIENT
Start: 2025-04-14

## 2025-05-23 DIAGNOSIS — I10 PRIMARY HYPERTENSION: ICD-10-CM

## 2025-05-23 DIAGNOSIS — E78.2 MIXED HYPERLIPIDEMIA: ICD-10-CM

## 2025-05-23 RX ORDER — LISINOPRIL 10 MG/1
10 TABLET ORAL DAILY
Qty: 90 TABLET | Refills: 0 | Status: SHIPPED | OUTPATIENT
Start: 2025-05-23

## 2025-05-23 RX ORDER — ATORVASTATIN CALCIUM 40 MG/1
40 TABLET, FILM COATED ORAL NIGHTLY
Qty: 90 TABLET | Refills: 0 | Status: SHIPPED | OUTPATIENT
Start: 2025-05-23

## 2025-06-04 ENCOUNTER — TELEPHONE (OUTPATIENT)
Dept: FAMILY MEDICINE CLINIC | Age: 75
End: 2025-06-04
Payer: MEDICARE

## 2025-06-18 ENCOUNTER — LAB (OUTPATIENT)
Dept: LAB | Facility: HOSPITAL | Age: 75
End: 2025-06-18
Payer: MEDICARE

## 2025-06-18 DIAGNOSIS — E11.9 TYPE 2 DIABETES MELLITUS WITHOUT COMPLICATION, WITHOUT LONG-TERM CURRENT USE OF INSULIN: ICD-10-CM

## 2025-06-18 DIAGNOSIS — N40.1 BENIGN PROSTATIC HYPERPLASIA WITH LOWER URINARY TRACT SYMPTOMS, SYMPTOM DETAILS UNSPECIFIED: ICD-10-CM

## 2025-06-18 DIAGNOSIS — I25.119 CORONARY ARTERY DISEASE INVOLVING NATIVE HEART WITH ANGINA PECTORIS, UNSPECIFIED VESSEL OR LESION TYPE: ICD-10-CM

## 2025-06-18 LAB
ALBUMIN SERPL-MCNC: 4.1 G/DL (ref 3.5–5.2)
ALBUMIN UR-MCNC: 2.3 MG/DL
ALBUMIN/GLOB SERPL: 1.6 G/DL
ALP SERPL-CCNC: 69 U/L (ref 39–117)
ALT SERPL W P-5'-P-CCNC: 16 U/L (ref 1–41)
ANION GAP SERPL CALCULATED.3IONS-SCNC: 11.5 MMOL/L (ref 5–15)
AST SERPL-CCNC: 20 U/L (ref 1–40)
BILIRUB SERPL-MCNC: 0.5 MG/DL (ref 0–1.2)
BUN SERPL-MCNC: 29 MG/DL (ref 8–23)
BUN/CREAT SERPL: 16.9 (ref 7–25)
CALCIUM SPEC-SCNC: 9.2 MG/DL (ref 8.6–10.5)
CHLORIDE SERPL-SCNC: 105 MMOL/L (ref 98–107)
CHOLEST SERPL-MCNC: 132 MG/DL (ref 0–200)
CO2 SERPL-SCNC: 22.5 MMOL/L (ref 22–29)
CREAT SERPL-MCNC: 1.72 MG/DL (ref 0.76–1.27)
CREAT UR-MCNC: 66.9 MG/DL
EGFRCR SERPLBLD CKD-EPI 2021: 40.9 ML/MIN/1.73
GLOBULIN UR ELPH-MCNC: 2.6 GM/DL
GLUCOSE SERPL-MCNC: 157 MG/DL (ref 65–99)
HBA1C MFR BLD: 6.7 % (ref 4.8–5.6)
HDLC SERPL-MCNC: 34 MG/DL (ref 40–60)
LDLC SERPL CALC-MCNC: 68 MG/DL (ref 0–100)
LDLC/HDLC SERPL: 1.82 {RATIO}
MICROALBUMIN/CREAT UR: 34.4 MG/G (ref 0–29)
POTASSIUM SERPL-SCNC: 4.7 MMOL/L (ref 3.5–5.2)
PROT SERPL-MCNC: 6.7 G/DL (ref 6–8.5)
SODIUM SERPL-SCNC: 139 MMOL/L (ref 136–145)
TRIGL SERPL-MCNC: 180 MG/DL (ref 0–150)
VLDLC SERPL-MCNC: 30 MG/DL (ref 5–40)

## 2025-06-18 PROCEDURE — 36415 COLL VENOUS BLD VENIPUNCTURE: CPT

## 2025-06-18 PROCEDURE — 80061 LIPID PANEL: CPT

## 2025-06-18 PROCEDURE — 82570 ASSAY OF URINE CREATININE: CPT

## 2025-06-18 PROCEDURE — 82043 UR ALBUMIN QUANTITATIVE: CPT

## 2025-06-18 PROCEDURE — 83036 HEMOGLOBIN GLYCOSYLATED A1C: CPT

## 2025-06-18 PROCEDURE — 80053 COMPREHEN METABOLIC PANEL: CPT

## 2025-06-18 RX ORDER — CLOPIDOGREL BISULFATE 75 MG/1
75 TABLET ORAL DAILY
Qty: 90 TABLET | Refills: 0 | Status: SHIPPED | OUTPATIENT
Start: 2025-06-18

## 2025-06-18 RX ORDER — TAMSULOSIN HYDROCHLORIDE 0.4 MG/1
1 CAPSULE ORAL 2 TIMES DAILY
Qty: 180 CAPSULE | Refills: 0 | Status: SHIPPED | OUTPATIENT
Start: 2025-06-18

## 2025-07-08 DIAGNOSIS — I25.119 CORONARY ARTERY DISEASE INVOLVING NATIVE HEART WITH ANGINA PECTORIS, UNSPECIFIED VESSEL OR LESION TYPE: ICD-10-CM

## 2025-07-08 RX ORDER — ISOSORBIDE MONONITRATE 30 MG/1
30 TABLET, EXTENDED RELEASE ORAL DAILY
Qty: 90 TABLET | Refills: 0 | Status: SHIPPED | OUTPATIENT
Start: 2025-07-08

## 2025-07-16 ENCOUNTER — OFFICE VISIT (OUTPATIENT)
Dept: FAMILY MEDICINE CLINIC | Age: 75
End: 2025-07-16
Payer: MEDICARE

## 2025-07-16 VITALS
TEMPERATURE: 98.1 F | OXYGEN SATURATION: 98 % | DIASTOLIC BLOOD PRESSURE: 50 MMHG | HEART RATE: 61 BPM | HEIGHT: 67 IN | SYSTOLIC BLOOD PRESSURE: 115 MMHG | WEIGHT: 212.8 LBS | BODY MASS INDEX: 33.4 KG/M2

## 2025-07-16 DIAGNOSIS — E78.2 MIXED HYPERLIPIDEMIA: ICD-10-CM

## 2025-07-16 DIAGNOSIS — E11.9 TYPE 2 DIABETES MELLITUS WITHOUT COMPLICATION, WITHOUT LONG-TERM CURRENT USE OF INSULIN: Primary | ICD-10-CM

## 2025-07-16 DIAGNOSIS — Z12.5 PROSTATE CANCER SCREENING: ICD-10-CM

## 2025-07-16 DIAGNOSIS — L85.3 DRY SKIN: ICD-10-CM

## 2025-07-16 DIAGNOSIS — I10 PRIMARY HYPERTENSION: ICD-10-CM

## 2025-07-16 DIAGNOSIS — L60.8 DEFORMITY OF TOENAIL: ICD-10-CM

## 2025-07-16 DIAGNOSIS — Z12.83 SKIN CANCER SCREENING: ICD-10-CM

## 2025-07-16 NOTE — ASSESSMENT & PLAN NOTE
Patient to try moisturizing more often.  Referral initiated.  Orders:    Ambulatory Referral to Dermatology

## 2025-07-16 NOTE — ASSESSMENT & PLAN NOTE
{Hyperlipidemia A/P Block (Optional):2584181472}Stable on atorvastatin 40 mg daily.  Heart healthy diet. Routine exercise and weight loss. Continue to monitor.

## 2025-07-16 NOTE — ASSESSMENT & PLAN NOTE
{Hypertension is (optional):0880482240} stable.  Continue medications as listed in HPI continue follow-up with cardiology.  Low-sodium diet.

## 2025-07-16 NOTE — ASSESSMENT & PLAN NOTE
{Diabetes (Optional):5183418817} continue Farxiga 10 mg daily, glipizide 10 mg twice daily, Actos 45 mg daily.  Diabetic diet.    Orders:    Comprehensive Metabolic Panel; Future    Lipid Panel; Future    Hemoglobin A1c; Future

## 2025-07-16 NOTE — PROGRESS NOTES
"Chief Complaint  Diabetes (3 month f/u)    Subjective          Lamin Martinez presents to [facility] for routine f/u. He sees cardio, Dr. Bloom, for CAD, HTN, HLD, angina. Has had 5 cardiac stents placed. He is on asa 81mg daily, atorvastatin 40mg daily, plavix 75mg daily, imdur 30mg daily, carvedilol 25 mg twice daily, lisinopril 10mg daily.      Patient also sees [facility] kidney specialist every year.       Pt is taking actos 45mg daily, glipizide 10mg BID and farxiga 10mg daily for DM. Due for blood work.      He is taking Flomax BID for bph. He has been to urologist once and follow-up was not needed as symptoms are controlled.     Patient was unable to go to dermatology as discussed at last visit due to work.  He still is driving a truck part-time.  He has been a  his entire life.  His left forearm is dry with multiple darker lesions from being on the side of the window.  He also has a dark spot under his middle toenail on his left foot.  States it is constant.  Does not go out.  There was no injury.      Objective   Vital Signs:   Vitals:    07/16/25 0816   BP: 115/50   Pulse: 61   Temp: 98.1 °F (36.7 °C)   TempSrc: Oral   SpO2: 98%   Weight: 96.5 kg (212 lb 12.8 oz)   Height: 170.2 cm (67.01\")       Body mass index is 33.32 kg/m².         Physical Exam  Vitals reviewed.   Constitutional:       General: He is not in acute distress.     Appearance: Normal appearance. He is not diaphoretic.   HENT:      Head: Normocephalic and atraumatic. Hair is normal.      Right Ear: Hearing and external ear normal.      Left Ear: Hearing and external ear normal.      Nose: Nose normal. No nasal deformity.      Mouth/Throat:      Mouth: Mucous membranes are moist.   Eyes:      General: Lids are normal. No scleral icterus.        Right eye: No discharge.         Left eye: No discharge.      Extraocular Movements: Extraocular movements intact.      Right eye: Normal extraocular " motion and no nystagmus.      Left eye: Normal extraocular motion and no nystagmus.      Conjunctiva/sclera: Conjunctivae normal.      Pupils: Pupils are equal, round, and reactive to light.   Neck:      Thyroid: No thyromegaly.   Cardiovascular:      Rate and Rhythm: Normal rate and regular rhythm.      Pulses: Normal pulses.           Dorsalis pedis pulses are 2+ on the right side and 2+ on the left side.      Heart sounds: Normal heart sounds. No murmur heard.     No gallop.   Pulmonary:      Effort: Pulmonary effort is normal. No respiratory distress.      Breath sounds: Normal breath sounds. No wheezing or rales.   Chest:      Chest wall: No tenderness.   Abdominal:      General: Bowel sounds are normal. There is no distension.      Palpations: Abdomen is soft. There is no mass.      Tenderness: There is no abdominal tenderness. There is no guarding.      Hernia: No hernia is present.   Musculoskeletal:         General: No tenderness or deformity. Normal range of motion.      Cervical back: Normal range of motion and neck supple.        Feet:    Feet:      Right foot:      Protective Sensation: 3 sites tested.  3 sites sensed.      Skin integrity: Skin integrity normal. No ulcer or blister.      Toenail Condition: Right toenails are normal.      Left foot:      Protective Sensation: 3 sites tested.  3 sites sensed.      Skin integrity: Skin integrity normal. No ulcer or blister.      Toenail Condition: Left toenails are normal.      Comments: Diabetic Foot Exam Performed and Monofilament Test Performed  Decreased sensation to areas noted above    Dark area noted under left middle toe nail  Lymphadenopathy:      Cervical: No cervical adenopathy.   Skin:     General: Skin is warm and dry.      Findings: No rash.   Neurological:      General: No focal deficit present.      Mental Status: He is alert and oriented to person, place, and time.      Coordination: Coordination normal.   Psychiatric:         Mood and  Affect: Mood normal.         Behavior: Behavior normal.         Thought Content: Thought content normal.         Judgment: Judgment normal.            Lab Results   Component Value Date    GLUCOSE 157 (H) 06/18/2025    BUN 29.0 (H) 06/18/2025    CREATININE 1.72 (H) 06/18/2025    EGFR 40.9 (L) 06/18/2025    BCR 16.9 06/18/2025    K 4.7 06/18/2025    CO2 22.5 06/18/2025    CALCIUM 9.2 06/18/2025    ALBUMIN 4.1 06/18/2025    BILITOT 0.5 06/18/2025    AST 20 06/18/2025    ALT 16 06/18/2025     Lab Results   Component Value Date    HGBA1C 6.70 (H) 06/18/2025     Lab Results   Component Value Date    WBC 7.43 08/26/2024    HGB 14.9 08/26/2024    HCT 45.9 08/26/2024    MCV 98.3 (H) 08/26/2024     08/26/2024     Lab Results   Component Value Date    CHOL 132 06/18/2025    CHOL 136 01/20/2025    CHOL 119 08/26/2024     Lab Results   Component Value Date    TRIG 180 (H) 06/18/2025    TRIG 161 (H) 01/20/2025    TRIG 157 (H) 08/26/2024     Lab Results   Component Value Date    HDL 34 (L) 06/18/2025    HDL 33 (L) 01/20/2025    HDL 35 (L) 08/26/2024     Lab Results   Component Value Date    LDL 68 06/18/2025    LDL 75 01/20/2025    LDL 57 08/26/2024     Lab Results   Component Value Date    TSH 3.500 06/20/2023     Labs reviewed with patient during appointment.                 Assessment and Plan    Assessment & Plan  Type 2 diabetes mellitus without complication, without long-term current use of insulin   continue Farxiga 10 mg daily, glipizide 10 mg twice daily, Actos 45 mg daily.  Diabetic diet.    Orders:    Comprehensive Metabolic Panel; Future    Lipid Panel; Future    Hemoglobin A1c; Future    Prostate cancer screening  Will notify patient of results and treat accordingly.  Orders:    PSA Screen; Future    Primary hypertension   stable.  Continue medications as listed in HPI continue follow-up with cardiology.  Low-sodium diet.         Mixed hyperlipidemia   Stable on atorvastatin 40 mg daily.  Heart healthy diet.  Routine exercise and weight loss. Continue to monitor.         Skin cancer screening  Referral initiated.  Worsen screening.  Orders:    Ambulatory Referral to Dermatology    Dry skin  Patient to try moisturizing more often.  Referral initiated.  Orders:    Ambulatory Referral to Dermatology    Deformity of toenail  Referral initiated.  Will send to podiatry if needed.  Orders:    Ambulatory Referral to Dermatology           Lab orders placed for next routine follow up.    Patient to notify office with any acute concerns or issues.  Patient verbalizes understanding, agrees with plan of care and has no further questions upon discharge.    Please note that portions of this note were completed with a voice recognition program.      Follow Up    No follow-ups on file.  Patient was given instructions and counseling regarding his condition or for health maintenance advice. Please see specific information pulled into the AVS if appropriate.       Current Outpatient Medications:     Ascorbic Acid 500 MG chewable tablet, Chew 500 mg Daily., Disp: , Rfl:     aspirin 81 MG chewable tablet, Chew 1 tablet Daily. Last dose 11-22-23, Disp: , Rfl:     atorvastatin (LIPITOR) 40 MG tablet, TAKE 1 TABLET BY MOUTH ONCE DAILY AT NIGHT, Disp: 90 tablet, Rfl: 0    Blood Glucose Monitoring Suppl device, Use as directed for blood glucose monitoring, Disp: 1 each, Rfl: 0    carvedilol (COREG) 25 MG tablet, Take 1 tablet by mouth 2 (Two) Times a Day With Meals., Disp: , Rfl:     clopidogrel (PLAVIX) 75 MG tablet, Take 1 tablet by mouth once daily, Disp: 90 tablet, Rfl: 0    Farxiga 10 MG tablet, Take 10 mg by mouth Daily., Disp: 90 tablet, Rfl: 3    glipizide (GLUCOTROL XL) 10 MG 24 hr tablet, Take 1 tablet by mouth 2 (Two) Times a Day for 180 days., Disp: 180 tablet, Rfl: 1    glucose blood test strip, Test blood glucose 1 time each day, Disp: 100 each, Rfl: 5    isosorbide mononitrate (IMDUR) 30 MG 24 hr tablet, Take 1 tablet by mouth once  daily, Disp: 90 tablet, Rfl: 0    Lancets misc, Test blood glucose 1 time each day, Disp: 100 each, Rfl: 5    lisinopril (PRINIVIL,ZESTRIL) 10 MG tablet, Take 1 tablet by mouth once daily, Disp: 90 tablet, Rfl: 0    Multiple Vitamins-Minerals (CENTRUM ADULT 50+ MULTIGUMMIES PO), Daily., Disp: , Rfl:     multivitamin (THERAGRAN) tablet tablet, Take 1 tablet by mouth Daily., Disp: , Rfl:     pioglitazone (ACTOS) 45 MG tablet, Take 1 tablet by mouth Daily., Disp: 90 tablet, Rfl: 1    tamsulosin (FLOMAX) 0.4 MG capsule 24 hr capsule, Take 1 capsule by mouth twice daily, Disp: 180 capsule, Rfl: 0    vitamin D3 125 MCG (5000 UT) capsule capsule, Take 1 capsule by mouth Daily. (Patient taking differently: Take 1 capsule by mouth As Needed. Takes 2 tabs on M, W, F), Disp: 90 capsule, Rfl: 1    cetaphil (CETAPHIL) lotion, Apply  topically to the appropriate area as directed 2 (Two) Times a Day. (Patient not taking: Reported on 7/16/2025), Disp: 473 mL, Rfl: 1      There are no discontinued medications.

## 2025-08-19 DIAGNOSIS — I10 PRIMARY HYPERTENSION: ICD-10-CM

## 2025-08-19 DIAGNOSIS — E78.2 MIXED HYPERLIPIDEMIA: ICD-10-CM

## 2025-08-19 RX ORDER — LISINOPRIL 10 MG/1
10 TABLET ORAL DAILY
Qty: 90 TABLET | Refills: 0 | Status: SHIPPED | OUTPATIENT
Start: 2025-08-19

## 2025-08-19 RX ORDER — ATORVASTATIN CALCIUM 40 MG/1
40 TABLET, FILM COATED ORAL NIGHTLY
Qty: 90 TABLET | Refills: 0 | Status: SHIPPED | OUTPATIENT
Start: 2025-08-19

## (undated) DEVICE — BNDG ELAS ECON W/CLIP 4IN 5YD LF STRL

## (undated) DEVICE — GLV SURG SENSICARE SLT PF LF 8.5 STRL

## (undated) DEVICE — EXTREMITY-LF: Brand: MEDLINE INDUSTRIES, INC.

## (undated) DEVICE — INTENDED FOR TISSUE SEPARATION, AND OTHER PROCEDURES THAT REQUIRE A SHARP SURGICAL BLADE TO PUNCTURE OR CUT.: Brand: BARD-PARKER ® CARBON RIB-BACK BLADES

## (undated) DEVICE — GLV SURG BIOGEL LTX PF 8 1/2

## (undated) DEVICE — GLV SURG SENSICARE PI PF LF 7 GRN STRL

## (undated) DEVICE — NDL HYPO ECLPS SFTY 22G 1IN

## (undated) DEVICE — GAUZE,SPONGE,4"X4",16PLY,STRL,LF,10/TRAY: Brand: MEDLINE

## (undated) DEVICE — COMFORT ARM SLING: Brand: DEROYAL

## (undated) DEVICE — SUT ETHLN 4/0 FS2 18IN 662H

## (undated) DEVICE — DRSNG WND GZ CURAD OIL EMULSION 3X3IN STRL

## (undated) DEVICE — GLV SURG SENSICARE SLT PF LF 7 STRL

## (undated) DEVICE — DISPOSABLE TOURNIQUET CUFF SINGLE BLADDER, SINGLE PORT AND QUICK CONNECT CONNECTOR: Brand: COLOR CUFF

## (undated) DEVICE — VAGINAL PREP TRAY: Brand: MEDLINE INDUSTRIES, INC.

## (undated) DEVICE — BNDG ESMARK 4IN 12FT LF STRL BLU

## (undated) DEVICE — UNDERCAST PADDING: Brand: DEROYAL